# Patient Record
Sex: MALE | Race: BLACK OR AFRICAN AMERICAN | Employment: FULL TIME | URBAN - METROPOLITAN AREA
[De-identification: names, ages, dates, MRNs, and addresses within clinical notes are randomized per-mention and may not be internally consistent; named-entity substitution may affect disease eponyms.]

---

## 2017-07-26 ENCOUNTER — OFFICE VISIT (OUTPATIENT)
Dept: ORTHOPEDIC SURGERY | Age: 47
End: 2017-07-26

## 2017-07-26 VITALS
DIASTOLIC BLOOD PRESSURE: 92 MMHG | RESPIRATION RATE: 16 BRPM | WEIGHT: 230.2 LBS | HEIGHT: 69 IN | SYSTOLIC BLOOD PRESSURE: 141 MMHG | BODY MASS INDEX: 34.1 KG/M2 | HEART RATE: 89 BPM

## 2017-07-26 DIAGNOSIS — R29.898 LEFT LEG WEAKNESS: ICD-10-CM

## 2017-07-26 DIAGNOSIS — M54.42 ACUTE LEFT-SIDED LOW BACK PAIN WITH LEFT-SIDED SCIATICA: Primary | ICD-10-CM

## 2017-07-26 PROBLEM — M54.40 ACUTE LEFT-SIDED LOW BACK PAIN WITH SCIATICA: Status: ACTIVE | Noted: 2017-07-26

## 2017-07-26 RX ORDER — PREDNISONE 10 MG/1
TABLET ORAL
Refills: 0 | COMMUNITY
Start: 2017-07-10 | End: 2017-10-11 | Stop reason: ALTCHOICE

## 2017-07-26 RX ORDER — DIAZEPAM 2 MG/1
TABLET ORAL
Refills: 0 | COMMUNITY
Start: 2017-07-10 | End: 2021-05-13

## 2017-07-26 RX ORDER — TOPIRAMATE 25 MG/1
TABLET ORAL
Qty: 90 TAB | Refills: 0 | Status: SHIPPED | OUTPATIENT
Start: 2017-07-26 | End: 2017-10-11 | Stop reason: SDUPTHER

## 2017-07-26 RX ORDER — HYDROCODONE BITARTRATE AND ACETAMINOPHEN 5; 325 MG/1; MG/1
1 TABLET ORAL
Qty: 28 TAB | Refills: 0 | Status: SHIPPED | OUTPATIENT
Start: 2017-07-26 | End: 2021-05-13

## 2017-07-26 RX ORDER — TRAMADOL HYDROCHLORIDE 50 MG/1
TABLET ORAL
Refills: 0 | COMMUNITY
Start: 2017-07-10 | End: 2021-05-13

## 2017-07-26 NOTE — PROGRESS NOTES
Chief complaint/History of Present Illness:  Chief Complaint   Patient presents with    Back Pain     lower    Hip Pain     left    Leg Pain     left    Follow-up     HPI  Trey Broussard is a  56 y.o.  male      HISTORY OF PRESENT ILLNESS:  The patient comes in today after having last been seen by Dr. Brittanie Pedro September 23, 2016. At that time, he was doing well on Topamax 75 mg twice a day for his low back pain and left leg pain due to a large L5-S1 disc protrusion that impinged the left S1 nerve. 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 He states on July 10, 2017, he was at work and lifted a box and felt immediate back pain. He states he then started having left leg pain that became unbearable in his left buttock down the posterior leg to his foot. He has numbness and weakness. He states he cannot feel his leg at all and denies saddle anesthesia. He went to the [facility] ER and was seen by the N.P. there who gave him Prednisone, Valium, and Tramadol. He did complete the Prednisone. The Valium and Tramadol really did not help at all. He did not get any relief. His pain just became progressively worse, and he went to the [facility] on July 20, 2017, at which time they repeated the Prednisone. He only has about one or two days left of that, and it has not helped at all. He feels like he is dragging his foot at times and walks with a limp due to the weakness in his leg. He denies fever or bowel or bladder dysfunction. He smokes one-third pack of cigarettes per day. He has been off the Topamax since last December, and was doing well up until this point. PHYSICAL EXAM:  Mr. Sophie Skaggs is a 56-year-old male. He is alert and oriented. He has a very antalgic gait favoring that left leg. He walks flat-footed and does not really pick that left leg up.   He has 3/5 strength of his left quadriceps and tib/ant and dorsiflexors, 1/5 strength of his left EHL and 5/5 on the right. He has a positive straight leg raise on the left and a positive contralateral straight leg raise on the right. ASSESSENT/PLAN:  This is a patient who has a known, large, disc protrusion on the left at L5-S1. I think he may have herniated his disc, which is giving him neurological dysfunction. We are going to get a STAT MRI. I am going to restart him back on Topamax, ramp him up to 75 mg at nighttime. He rates his pain as a 9/10. He denies any alcohol or substance abuse issues personally or in his family. We are going to give him Norco 5/325 mg to take every six hours as-needed for pain. I emphasized to him he is only to take it as needed. If he does not need it every six hours, he should not take it but only when his pain is very extreme or he cannot handle it. He verbalized understanding. We are going to have him followup with Dr. Ravinder Monson following the MRI. Review of systems:    History reviewed. No pertinent past medical history. History reviewed. No pertinent surgical history. Social History     Social History    Marital status:      Spouse name: N/A    Number of children: N/A    Years of education: N/A     Occupational History    Not on file. Social History Main Topics    Smoking status: Current Every Day Smoker     Packs/day: 0.25    Smokeless tobacco: Never Used    Alcohol use 7.2 oz/week     12 Cans of beer per week    Drug use: No    Sexual activity: Not on file     Other Topics Concern    Not on file     Social History Narrative     Family History   Problem Relation Age of Onset    No Known Problems Mother     No Known Problems Father        Physical Exam:  Visit Vitals    BP (!) 141/92    Pulse 89    Resp 16    Ht 5' 9\" (1.753 m)    Wt 230 lb 3.2 oz (104.4 kg)    BMI 33.99 kg/m2     Pain Scale: 8/10     has been . reviewed and is appropriate    Pt has a consistent UDS in the record      Diagnoses and all orders for this visit:    1. Acute left-sided low back pain with left-sided sciatica  -     MRI LUMB SPINE WO CONT; Future    2. Left leg weakness  -     MRI LUMB SPINE WO CONT; Future    Other orders  -     topiramate (TOPAMAX) 25 mg tablet; 1 tab nightly  x 5 days, then 2 tabs nightly x 5 day and then 3 tabs nightly  -     HYDROcodone-acetaminophen (NORCO) 5-325 mg per tablet; Take 1 Tab by mouth every six (6) hours as needed for Pain. Max Daily Amount: 4 Tabs. Indications: Pain            Follow-up Disposition:  Return for f/u as soon as MRI is done with Dr Ben Garcia.         We have informed Chiquita Mckinley to notify us for immediate appointment if he has any worsening neurogical symptoms or if an emergency situation presents, then call 1

## 2017-07-26 NOTE — LETTER
NOTIFICATION RETURN TO WORK / SCHOOL 
 
7/26/2017 11:33 AM 
 
Mr. Nohelia Mueller 
[address] [address] [place] [address] To Whom It May Concern: 
 
Nohelia Mueller is currently under the care of [address]. Mr. Cortney Skaggs is to remain out of work for 1 week, until he is seen in our office. At which time, his work status will be re-evaluated. If there are questions or concerns please have the patient contact our office. Sincerely, Janis Mckinney NP

## 2017-07-28 ENCOUNTER — DOCUMENTATION ONLY (OUTPATIENT)
Dept: ORTHOPEDIC SURGERY | Age: 47
End: 2017-07-28

## 2017-07-28 NOTE — PROGRESS NOTES
MRI Lumbar Spine with is scheduled at [facility], [id number], on 07/28/17, arrive 3:00PM, test 3:30PM. Pre-authorization [id number], effective 07/26/17-10/26/17

## 2017-08-01 ENCOUNTER — OFFICE VISIT (OUTPATIENT)
Dept: ORTHOPEDIC SURGERY | Age: 47
End: 2017-08-01

## 2017-08-01 VITALS
WEIGHT: 230 LBS | BODY MASS INDEX: 34.07 KG/M2 | DIASTOLIC BLOOD PRESSURE: 93 MMHG | HEART RATE: 94 BPM | SYSTOLIC BLOOD PRESSURE: 139 MMHG | HEIGHT: 69 IN

## 2017-08-01 DIAGNOSIS — M51.26 LUMBAR HERNIATED DISC: Primary | ICD-10-CM

## 2017-08-01 DIAGNOSIS — M54.16 LUMBAR NEURITIS: ICD-10-CM

## 2017-08-01 DIAGNOSIS — M48.061 LUMBAR SPINAL STENOSIS: ICD-10-CM

## 2017-08-01 RX ORDER — TOPIRAMATE 25 MG/1
TABLET ORAL
Qty: 90 TAB | Refills: 1 | Status: SHIPPED | OUTPATIENT
Start: 2017-08-01 | End: 2017-10-11 | Stop reason: SDUPTHER

## 2017-08-01 NOTE — MR AVS SNAPSHOT
Visit Information Date & Time Provider Department Dept. Phone Encounter #  
 8/1/2017  8:30 AM Davion Hobbs MD [facility] [place] [phone] [id number] Follow-up Instructions Return in about 4 weeks (around 8/29/2017). Upcoming Health Maintenance Date Due Pneumococcal 19-64 Medium Risk (1 of 1 - PPSV23) 1/16/1990 DTaP/Tdap/Td series (1 - Tdap) 1/16/1992 INFLUENZA AGE 9 TO ADULT 8/1/2017 Allergies as of 8/1/2017  Review Complete On: 8/1/2017 By: Davion Hobbs MD  
 No Known Allergies Current Immunizations  Never Reviewed No immunizations on file. Not reviewed this visit You Were Diagnosed With   
  
 Codes Comments Lumbar herniated disc    -  Primary ICD-10-CM: M51.26 
ICD-9-CM: 722.10 Lumbar spinal stenosis     ICD-10-CM: M48.06 
ICD-9-CM: 724.02 Lumbar neuritis     ICD-10-CM: M54.16 
ICD-9-CM: 724.4 Vitals BP Pulse Height(growth percentile) Weight(growth percentile) BMI Smoking Status (!) 139/93 94 5' 9\" (1.753 m) 230 lb (104.3 kg) 33.97 kg/m2 Current Every Day Smoker Vitals History BMI and BSA Data Body Mass Index Body Surface Area  
 33.97 kg/m 2 2.25 m 2 Preferred Pharmacy Pharmacy Name Phone [facility], [address] [phone] Your Updated Medication List  
  
   
This list is accurate as of: 8/1/17  9:07 AM.  Always use your most recent med list.  
  
  
  
  
 diazePAM 2 mg tablet Commonly known as:  VALIUM TK 1 T PO TID PRN HYDROcodone-acetaminophen 5-325 mg per tablet Commonly known as:  Aga [place]quito Take 1 Tab by mouth every six (6) hours as needed for Pain. Max Daily Amount: 4 Tabs. Indications: Pain  
  
 naproxen sodium 220 mg Cap Take  by mouth.  
  
 predniSONE 10 mg tablet Commonly known as:  DELTASONE  
  
 * topiramate 25 mg tablet Commonly known as:  TOPAMAX 3 tabs PO BID * topiramate 25 mg tablet Commonly known as:  TOPAMAX  
1 tab nightly  x 5 days, then 2 tabs nightly x 5 day and then 3 tabs nightly * topiramate 25 mg tablet Commonly known as:  TOPAMAX 3 tabs PO QHS  
  
 traMADol 50 mg tablet Commonly known as:  ULTRAM  
TK 1 T PO Q 6 H PRN  
  
 * Notice: This list has 3 medication(s) that are the same as other medications prescribed for you. Read the directions carefully, and ask your doctor or other care provider to review them with you. Prescriptions Sent to Pharmacy Refills  
 topiramate (TOPAMAX) 25 mg tablet 1 Sig: 3 tabs PO QHS Class: Normal  
 Pharmacy: [address] [place] [address], [address] [place] [address] #: [phone] Follow-up Instructions Return in about 4 weeks (around 8/29/2017). Introducing [facility]! [facility] introduces TasteBook patient portal. Now you can access parts of your medical record, email your doctor's office, and request medication refills online. 1. In your internet browser, go to [email or web address] 2. Click on the First Time User? Click Here link in the Sign In box. You will see the New Member Sign Up page. 3. Enter your TasteBook Access Code exactly as it appears below. You will not need to use this code after youve completed the sign-up process. If you do not sign up before the expiration date, you must request a new code. · TasteBook Access Code: [email or web address] Expires: 10/23/2017 11:33 AM 
 
4. Enter the last four digits of your Social Security Number (xxxx) and Date of Birth (mm/dd/yyyy) as indicated and click Submit. You will be taken to the next sign-up page. 5. Create a TasteBook ID. This will be your TasteBook login ID and cannot be changed, so think of one that is secure and easy to remember. 6. Create a Tipping Bucket password. You can change your password at any time. 7. Enter your Password Reset Question and Answer. This can be used at a later time if you forget your password. 8. Enter your e-mail address. You will receive e-mail notification when new information is available in [address] 9. Click Sign Up. You can now view and download portions of your medical record. 10. Click the Download Summary menu link to download a portable copy of your medical information. If you have questions, please visit the Frequently Asked Questions section of the Tipping Bucket website. Remember, Tipping Bucket is NOT to be used for urgent needs. For medical emergencies, dial 911. Now available from your iPhone and Android! Please provide this summary of care documentation to your next provider. If you have any questions after today's visit, please call [phone].

## 2017-08-01 NOTE — PROGRESS NOTES
[facility]  [address], [place]   Phone: [phone]  Fax: [phone]        PROGRESS NOTE      HISTORY OF PRESENT ILLNESS:  The patient is a 56 y.o. male and was seen today for follow up of low back pain into left hip with LLE tingling in an L5 distribution to the calf. He denies RLE symptoms. He reports LLE symptoms are more severe than low back pain. Pt states symptoms have improved since onset. He states all positions for prolonged periods exacerbate his pain. He is on full duty at work. Note from [place], [place] dated 5/15/16 patient was seen with c/o low back into BLE x 2 weeks. Of note, onset of pain after lifting 50 lbs at work. At that time, he was treated with Prednisone and Flexeril with significant relief. He has also taken Aleve with slight relief. Pt denies change in bowel or bladder habits. Pt denies fever, weight loss, or skin changes. He denies previous lumbar spinal surgery or blocks. He has not attended physical therapy/chiropractor. He denies hx of glaucoma or DM. Noted, patient is right-hand dominant and a smoker. Lumbosacral spine x-ray report dated 5/15/16 reviewed. Per report, mild L5-S1 disc space narrowing and facet hypertrophy. MRI of lumbar spine dated 9/9/16 reviewed. Per report, prominent left paracentral disc extrusion at L5-S1 with caudal migration of disc material measuring up to 12 x 9 x 14 mm with associated narrowing of the left lateral recess and evidence of impingement upon the left S1 nerve root. At his last clinical appointment, he was not particularly interested in surgical intervention or blocks. Patient wished to continue his current treatment. Patient noted significant improvement in symptoms with increased Topamax 75 mg BID. He was provided with refills of Topamax. I offered a physical therapy referral. He declined. The patient returns today with LLE numbness extending in an S1 distribution to the ankle.  He rates pain 6/10, an improvement since his last visit (9/10). I last saw patient on 9/23/16 who was to f/u in 3 month's time but failed to do so. Pt more recently saw my nurse practitioner on 7/26/17. 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 Pt was given a course of oral steroids on 7/10/17 after he reported to the ER after his work-related injury. Patience Man NP restarted patient on Topamax 25 mg 3 tabs ramped and gave him a Rx for Norco 5-325 mg on 7/26/17. Lumbar spine MRI dated 7/28/17 reviewed. Per report, only very minimal interval decrease ins seize of the still moderate left paracentral extruded L5-S1 disc herniation since prior exam 9/2016. Persistent stable flattening of the left ventral lateral thecal sac and impingement on the left traversing S1 root. Minimal developmental lumbar canal stenosis. Mild L4-5 central stenosis from additional mild disc bulge. Mild bilateral L4-5, minimal bilateral L5-S1 foraminal stenosis. Minimal bilateral L4-5, L5-S1 facet joint osteoarthritis.  reviewed. History reviewed. No pertinent past medical history. Social History     Social History    Marital status:      Spouse name: N/A    Number of children: N/A    Years of education: N/A     Occupational History    Not on file.      Social History Main Topics    Smoking status: Current Every Day Smoker     Packs/day: 0.25    Smokeless tobacco: Never Used    Alcohol use 7.2 oz/week     12 Cans of beer per week    Drug use: No    Sexual activity: Not on file     Other Topics Concern    Not on file     Social History Narrative       Current Outpatient Prescriptions   Medication Sig Dispense Refill    topiramate (TOPAMAX) 25 mg tablet 3 tabs PO QHS 90 Tab 1    topiramate (TOPAMAX) 25 mg tablet 1 tab nightly  x 5 days, then 2 tabs nightly x 5 day and then 3 tabs nightly 90 Tab 0    HYDROcodone-acetaminophen (NORCO) 5-325 mg per tablet Take 1 Tab by mouth every six (6) hours as needed for Pain. Max Daily Amount: 4 Tabs. Indications: Pain 28 Tab 0    topiramate (TOPAMAX) 25 mg tablet 3 tabs PO  Tab 2    diazePAM (VALIUM) 2 mg tablet TK 1 T PO TID PRN  0    predniSONE (DELTASONE) 10 mg tablet   0    traMADol (ULTRAM) 50 mg tablet TK 1 T PO Q 6 H PRN  0    naproxen sodium 220 mg cap Take  by mouth. No Known Allergies       PHYSICAL EXAMINATION    Visit Vitals    BP (!) 139/93    Pulse 94    Ht 5' 9\" (1.753 m)    Wt 230 lb (104.3 kg)    BMI 33.97 kg/m2       CONSTITUTIONAL: NAD, A&O x 3  SENSATION: Intact to light touch throughout  RANGE OF MOTION: The patient has full passive range of motion in all four extremities. MOTOR:  Straight Leg Raise: Positive, left    Previously reported weakness in extensor hallucis longus not noted on examination today. Hip Flex Knee Ext Knee Flex Ankle DF GTE Ankle PF Tone   Right +4/5 +4/5 +4/5 +4/5 +4/5 +4/5 +4/5   Left +4/5 +4/5 +4/5 +4/5 +4/5 +4/5 +4/5       ASSESSMENT   Diagnoses and all orders for this visit:    1. Lumbar herniated disc    2. Lumbar spinal stenosis    3. Lumbar neuritis    Other orders  -     topiramate (TOPAMAX) 25 mg tablet; 3 tabs PO QHS          IMPRESSION AND PLAN:  The patient describes symptoms consistent with S1 radiculopathy. He is neurologically intact. Patient is not interested in lumbar blocks at this time. He should continue on Topamax 75 mg qhs ramped and was provided with refills. OOW until 8/7/17. I will see the patient back in 1 month's time or earlier if needed. Written by Penelope Garcia, as dictated by Candice Langford MD  I examined the patient, reviewed and agree with the note.

## 2017-08-01 NOTE — LETTER
NOTIFICATION OF RETURN TO WORK / SCHOOL 
 
8/1/2017 9:06 AM 
 
Mr. Jyoti Knapp 
[address] [address] [address] To Whom It May Concern: 
 
Jyoti Knapp was under the care of [address] today 8-1-17. Mr. Grupo Tabares is to remain out of work until 8-7-17. If you have any questions please call the office at [phone]. Sincerely, Joe Martinez MD

## 2017-08-21 DIAGNOSIS — M54.42 ACUTE LEFT-SIDED LOW BACK PAIN WITH LEFT-SIDED SCIATICA: ICD-10-CM

## 2017-08-21 DIAGNOSIS — R29.898 LEFT LEG WEAKNESS: ICD-10-CM

## 2017-08-30 ENCOUNTER — OFFICE VISIT (OUTPATIENT)
Dept: ORTHOPEDIC SURGERY | Age: 47
End: 2017-08-30

## 2017-08-30 VITALS
DIASTOLIC BLOOD PRESSURE: 82 MMHG | BODY MASS INDEX: 33.33 KG/M2 | HEART RATE: 63 BPM | RESPIRATION RATE: 16 BRPM | HEIGHT: 69 IN | WEIGHT: 225 LBS | SYSTOLIC BLOOD PRESSURE: 117 MMHG

## 2017-08-30 DIAGNOSIS — M51.26 LUMBAR HERNIATED DISC: Primary | ICD-10-CM

## 2017-08-30 DIAGNOSIS — M54.16 RADICULOPATHY, LUMBAR REGION: ICD-10-CM

## 2017-08-30 DIAGNOSIS — M48.061 LUMBAR SPINAL STENOSIS: ICD-10-CM

## 2017-08-30 RX ORDER — TOPIRAMATE 25 MG/1
TABLET ORAL
Qty: 180 TAB | Refills: 1 | Status: SHIPPED | OUTPATIENT
Start: 2017-08-30 | End: 2017-10-11 | Stop reason: SDUPTHER

## 2017-08-30 NOTE — MR AVS SNAPSHOT
Visit Information Date & Time Provider Department Dept. Phone Encounter #  
 8/30/2017  8:30 AM Bry Parada NP [facility] [place] [id number] [phone] Follow-up Instructions Return in about 6 weeks (around 10/11/2017) for with Kasey. Follow-up and Disposition History Your Appointments 10/11/2017  9:10 AM  
Follow Up with Bry Parada NP  
[facility] Appt Note: 6 week follow up [address] [facility] [id number]  
[address] [facility] Upcoming Health Maintenance Date Due Pneumococcal 19-64 Medium Risk (1 of 1 - PPSV23) 1/16/1990 DTaP/Tdap/Td series (1 - Tdap) 1/16/1992 INFLUENZA AGE 9 TO ADULT 8/1/2017 Allergies as of 8/30/2017  Review Complete On: 8/30/2017 By: Bry Parada NP No Known Allergies Current Immunizations  Never Reviewed No immunizations on file. Not reviewed this visit You Were Diagnosed With   
  
 Codes Comments Lumbar herniated disc    -  Primary ICD-10-CM: M51.26 
ICD-9-CM: 722.10 Lumbar spinal stenosis     ICD-10-CM: M48.06 
ICD-9-CM: 724.02 Radiculopathy, lumbar region     ICD-10-CM: M54.16 
ICD-9-CM: 724.4 Vitals BP Pulse Resp Height(growth percentile) Weight(growth percentile) BMI  
 117/82 63 16 5' 9\" (1.753 m) 225 lb (102.1 kg) 33.23 kg/m2 Smoking Status Current Every Day Smoker BMI and BSA Data Body Mass Index Body Surface Area  
 33.23 kg/m 2 2.23 m 2 Preferred Pharmacy Pharmacy Name Phone [facility], [address] [facility] [phone] Your Updated Medication List  
  
   
This list is accurate as of: 8/30/17  9:09 AM.  Always use your most recent med list.  
  
  
  
  
 diazePAM 2 mg tablet Commonly known as:  VALIUM  
 TK 1 T PO TID PRN HYDROcodone-acetaminophen 5-325 mg per tablet Commonly known as:  [place] Collar Take 1 Tab by mouth every six (6) hours as needed for Pain. Max Daily Amount: 4 Tabs. Indications: Pain  
  
 naproxen sodium 220 mg Cap Take  by mouth.  
  
 predniSONE 10 mg tablet Commonly known as:  DELTASONE  
  
 * topiramate 25 mg tablet Commonly known as:  TOPAMAX 3 tabs PO BID * topiramate 25 mg tablet Commonly known as:  TOPAMAX  
1 tab nightly  x 5 days, then 2 tabs nightly x 5 day and then 3 tabs nightly * topiramate 25 mg tablet Commonly known as:  TOPAMAX 3 tabs PO QHS * topiramate 25 mg tablet Commonly known as:  TOPAMAX 3 tabs bid  
  
 traMADol 50 mg tablet Commonly known as:  ULTRAM  
TK 1 T PO Q 6 H PRN  
  
 * Notice: This list has 4 medication(s) that are the same as other medications prescribed for you. Read the directions carefully, and ask your doctor or other care provider to review them with you. Prescriptions Printed Refills  
 topiramate (TOPAMAX) 25 mg tablet 1 Sig: 3 tabs bid Class: Print Follow-up Instructions Return in about 6 weeks (around 10/11/2017) for with Kasey. Introducing [facility]! Diana Bruce introduces Litbloc patient portal. Now you can access parts of your medical record, email your doctor's office, and request medication refills online. 1. In your internet browser, go to [email or web address] 2. Click on the First Time User? Click Here link in the Sign In box. You will see the New Member Sign Up page. 3. Enter your Litbloc Access Code exactly as it appears below. You will not need to use this code after youve completed the sign-up process. If you do not sign up before the expiration date, you must request a new code. · Litbloc Access Code: [email or web address] Expires: 10/23/2017 11:33 AM 
 
4.  Enter the last four digits of your Social Security Number (xxxx) and Date of Birth (mm/dd/yyyy) as indicated and click Submit. You will be taken to the next sign-up page. 5. Create a Paytrail ID. This will be your Paytrail login ID and cannot be changed, so think of one that is secure and easy to remember. 6. Create a Paytrail password. You can change your password at any time. 7. Enter your Password Reset Question and Answer. This can be used at a later time if you forget your password. 8. Enter your e-mail address. You will receive e-mail notification when new information is available in [address] 9. Click Sign Up. You can now view and download portions of your medical record. 10. Click the Download Summary menu link to download a portable copy of your medical information. If you have questions, please visit the Frequently Asked Questions section of the Paytrail website. Remember, Paytrail is NOT to be used for urgent needs. For medical emergencies, dial 911. Now available from your iPhone and Android! Please provide this summary of care documentation to your next provider. If you have any questions after today's visit, please call [phone].

## 2017-08-30 NOTE — LETTER
NOTIFICATION OF RETURN TO WORK / SCHOOL 
 
8/30/2017 9:05 AM 
 
Mr. Tamy Bray 
[address] [facility] [address] [address] [place] To Whom It May Concern: 
 
Tamy Bray is under the care of [address]. 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 If there are questions or concerns please have the patient contact our office. Sincerely, Libra Augustin NP

## 2017-08-30 NOTE — PROGRESS NOTES
Chief complaint/History of Present Illness:  Chief Complaint   Patient presents with    Back Pain     Follow up     Hip Pain     left hip    Leg Pain     ANDRADE Grady is a  56 y.o.  male      HISTORY OF PRESENT ILLNESS:  The patient comes in today for followup of his low back pain with radiating left buttock and leg pain down to his calf. He has numbness in his left leg and foot now. His leg pain is worse than his back pain. He is rating it as a 5/10. He does have a known left paracentral disc herniation. He was last seen by Dr. Jerica Szymanski. He is on Topamax 75 mg at bedtime. He states that does help some. Sitting increases his leg pain. 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 Once he gets off the forklift, it will eventually get a little bit better, but he finds that it aggravates it when he has to be in the forklift. When he does other activities, such as with the containers or paperwork and such, he does not really have that issue. He smokes one-third pack of cigarettes per day. He denies fever or bowel or bladder dysfunction. PHYSICAL EXAM:  Mr. Jaclyn Guido is a 56-year-old male. He is alert and oriented. He has a full weight bearing, non-antalgic gait with 4/5 strength of the bilateral lower extremities. He has a positive straight leg raise on the left and a positive contralateral straight leg raise on the right. ASSESSENT/PLAN:  This is a patient with a left herniated disc, which causes stenosis and flattens the left, ventral lateral thecal sac impinging the left tranversing S1 nerve root. We talked about blocks. He is still hesitant to do that. He still wishes to hold off on any surgery. We are going to increase his Topamax to 75 mg ramped up to twice a day. He has been on this dose before in the past and it did seem to help.   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 We will see him back in six weeks. Review of systems:    History reviewed. No pertinent past medical history. History reviewed. No pertinent surgical history. Social History     Social History    Marital status:      Spouse name: N/A    Number of children: N/A    Years of education: N/A     Occupational History    Not on file. Social History Main Topics    Smoking status: Current Every Day Smoker     Packs/day: 0.25    Smokeless tobacco: Never Used    Alcohol use 7.2 oz/week     12 Cans of beer per week    Drug use: No    Sexual activity: Not on file     Other Topics Concern    Not on file     Social History Narrative     Family History   Problem Relation Age of Onset    No Known Problems Mother     No Known Problems Father        Physical Exam:  Visit Vitals    /82    Pulse 63    Resp 16    Ht 5' 9\" (1.753 m)    Wt 225 lb (102.1 kg)    BMI 33.23 kg/m2     Pain Scale: 5/10     has been . reviewed and is appropriat    Diagnoses and all orders for this visit:    1. Lumbar herniated disc    2. Lumbar spinal stenosis    3. Radiculopathy, lumbar region    Other orders  -     topiramate (TOPAMAX) 25 mg tablet; 3 tabs bid            Follow-up Disposition:  Return in about 6 weeks (around 10/11/2017) for with Kasey.         We have informed Analy Meka to notify us for immediate appointment if he has any worsening neurogical symptoms or if an emergency situation presents, then call [phone]

## 2017-10-11 ENCOUNTER — OFFICE VISIT (OUTPATIENT)
Dept: ORTHOPEDIC SURGERY | Age: 47
End: 2017-10-11

## 2017-10-11 VITALS
BODY MASS INDEX: 33.03 KG/M2 | WEIGHT: 223 LBS | HEIGHT: 69 IN | HEART RATE: 61 BPM | SYSTOLIC BLOOD PRESSURE: 104 MMHG | DIASTOLIC BLOOD PRESSURE: 77 MMHG

## 2017-10-11 DIAGNOSIS — M48.062 SPINAL STENOSIS OF LUMBAR REGION WITH NEUROGENIC CLAUDICATION: ICD-10-CM

## 2017-10-11 DIAGNOSIS — M54.16 RADICULOPATHY, LUMBAR REGION: ICD-10-CM

## 2017-10-11 DIAGNOSIS — M51.26 LUMBAR HERNIATED DISC: Primary | ICD-10-CM

## 2017-10-11 RX ORDER — TOPIRAMATE 25 MG/1
TABLET ORAL
Qty: 180 TAB | Refills: 5 | Status: SHIPPED | OUTPATIENT
Start: 2017-10-11 | End: 2018-06-13 | Stop reason: SDUPTHER

## 2017-10-11 NOTE — PATIENT INSTRUCTIONS
Back Pain: Care Instructions  Your Care Instructions    Back pain has many possible causes. It is often related to problems with muscles and ligaments of the back. It may also be related to problems with the nerves, discs, or bones of the back. Moving, lifting, standing, sitting, or sleeping in an awkward way can strain the back. Sometimes you don't notice the injury until later. Arthritis is another common cause of back pain. Although it may hurt a lot, back pain usually improves on its own within several weeks. Most people recover in 12 weeks or less. Using good home treatment and being careful not to stress your back can help you feel better sooner. Follow-up care is a key part of your treatment and safety. Be sure to make and go to all appointments, and call your doctor if you are having problems. Its also a good idea to know your test results and keep a list of the medicines you take. How can you care for yourself at home? · Sit or lie in positions that are most comfortable and reduce your pain. Try one of these positions when you lie down:  ¨ Lie on your back with your knees bent and supported by large pillows. ¨ Lie on the floor with your legs on the seat of a sofa or chair. Jeffry Maw on your side with your knees and hips bent and a pillow between your legs. ¨ Lie on your stomach if it does not make pain worse. · Do not sit up in bed, and avoid soft couches and twisted positions. Bed rest can help relieve pain at first, but it delays healing. Avoid bed rest after the first day of back pain. · Change positions every 30 minutes. If you must sit for long periods of time, take breaks from sitting. Get up and walk around, or lie in a comfortable position. · Try using a heating pad on a low or medium setting for 15 to 20 minutes every 2 or 3 hours. Try a warm shower in place of one session with the heating pad. · You can also try an ice pack for 10 to 15 minutes every 2 to 3 hours.  Put a thin cloth between the ice pack and your skin. · Take pain medicines exactly as directed. ¨ If the doctor gave you a prescription medicine for pain, take it as prescribed. ¨ If you are not taking a prescription pain medicine, ask your doctor if you can take an over-the-counter medicine. · Take short walks several times a day. You can start with 5 to 10 minutes, 3 or 4 times a day, and work up to longer walks. Walk on level surfaces and avoid hills and stairs until your back is better. · Return to work and other activities as soon as you can. Continued rest without activity is usually not good for your back. · To prevent future back pain, do exercises to stretch and strengthen your back and stomach. Learn how to use good posture, safe lifting techniques, and proper body mechanics. When should you call for help? Call your doctor now or seek immediate medical care if:  · You have new or worsening numbness in your legs. · You have new or worsening weakness in your legs. (This could make it hard to stand up.)  · You lose control of your bladder or bowels. Watch closely for changes in your health, and be sure to contact your doctor if:  · Your pain gets worse. · You are not getting better after 2 weeks. Where can you learn more? Go to http://theresa-uzma.info/. Enter T981 in the search box to learn more about \"Back Pain: Care Instructions. \"  Current as of: March 21, 2017  Content Version: 11.3  © 5183-9646 [employer]. Care instructions adapted under license by [employer] (which disclaims liability or warranty for this information). If you have questions about a medical condition or this instruction, always ask your healthcare professional. [address] any warranty or liability for your use of this information.

## 2017-10-11 NOTE — PROGRESS NOTES
Chief complaint/History of Present Illness:  Chief Complaint   Patient presents with    Follow-up     lumbar LLE pain terminating at calf. numbness/tingling in left foot     HPI  Harshal Harvey is a  56 y.o.  male      HISTORY OF PRESENT ILLNESS:  The patient comes in today for followup of his chronic low back pain that radiates to his left buttock down to his calf with numbness down to his foot. His leg pain is worse than his back pain. At his last visit, we increased his Topamax to 75 mg twice a day. He states it does help. 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 It is a confined space. He is always having to turn and twist.  He has been working, lifting boxes, and cleaning up at work. His work would like him to be able to work the forklift up to two hours a day, and he feels like he may be able to do that. He states when he was able to work the forklift, his pain was an 8-9/10. Now that he is not doing that, it is a 4/10, so he feels that is quite a significant improvement. He still smokes one-third pack of cigarettes per day. He denies fever or bowel or bladder dysfunction. PHYSICAL EXAM:  Mr. Monica Vizcaino is a 60-year-old male. He is alert and oriented. He has normal mood and affect. He has a full weight bearing, non-antalgic gait. He has 4/5 strength of the bilateral lower extremities and negative straight leg raise. ASSESSENT/PLAN:  This is a patient with a left-sided herniated disc with stenosis that impinges on the S1 nerve root. We are going to keep him at 75 mg of Topamax twice a day. I have sent refills in for him. 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 Review of systems:    History reviewed. No pertinent past medical history. History reviewed.  No pertinent surgical history. Social History     Social History    Marital status:      Spouse name: N/A    Number of children: N/A    Years of education: N/A     Occupational History    Not on file. Social History Main Topics    Smoking status: Current Every Day Smoker     Packs/day: 0.25    Smokeless tobacco: Never Used    Alcohol use 7.2 oz/week     12 Cans of beer per week    Drug use: No    Sexual activity: Not on file     Other Topics Concern    Not on file     Social History Narrative     Family History   Problem Relation Age of Onset    No Known Problems Mother     No Known Problems Father        Physical Exam:  Visit Vitals    /77    Pulse 61    Ht 5' 9\" (1.753 m)    Wt 223 lb (101.2 kg)    BMI 32.93 kg/m2     Pain Scale: 4/10     has been . reviewed and is appropriate        Diagnoses and all orders for this visit:    1. Lumbar herniated disc    2. Spinal stenosis of lumbar region with neurogenic claudication    3. Radiculopathy, lumbar region    Other orders  -     topiramate (TOPAMAX) 25 mg tablet; 3 tabs bid            Follow-up Disposition:  Return in about 6 months (around 4/11/2018) for with Kasey.         We have informed Harshal Harvey to notify us for immediate appointment if he has any worsening neurogical symptoms or if an emergency situation presents, then call 911

## 2017-10-11 NOTE — LETTER
NOTIFICATION OF RETURN TO WORK / SCHOOL 
 
10/11/2017 9:27 AM 
 
Mr. Tu Yates 
[address] [facility] [address] [id number] [facility] To Whom It May Concern: 
 
Tu Yates was under the care of [address] [place] [address] [facility] today 10-11-17. Mr. 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 If you have any questions please call the office at [phone]. Sincerely, Radha Soliz NP

## 2018-02-12 ENCOUNTER — OFFICE VISIT (OUTPATIENT)
Dept: ORTHOPEDIC SURGERY | Age: 48
End: 2018-02-12

## 2018-02-12 VITALS
SYSTOLIC BLOOD PRESSURE: 126 MMHG | OXYGEN SATURATION: 99 % | WEIGHT: 217 LBS | HEIGHT: 69 IN | HEART RATE: 67 BPM | DIASTOLIC BLOOD PRESSURE: 87 MMHG | BODY MASS INDEX: 32.14 KG/M2

## 2018-02-12 DIAGNOSIS — M51.36 OTHER INTERVERTEBRAL DISC DEGENERATION, LUMBAR REGION: Primary | ICD-10-CM

## 2018-02-12 DIAGNOSIS — M54.16 RADICULOPATHY, LUMBAR REGION: ICD-10-CM

## 2018-02-12 DIAGNOSIS — M51.26 HNP (HERNIATED NUCLEUS PULPOSUS), LUMBAR: ICD-10-CM

## 2018-02-12 DIAGNOSIS — M48.062 SPINAL STENOSIS OF LUMBAR REGION WITH NEUROGENIC CLAUDICATION: ICD-10-CM

## 2018-02-12 RX ORDER — NAPROXEN 500 MG/1
500 TABLET ORAL 2 TIMES DAILY WITH MEALS
Qty: 60 TAB | Refills: 0 | Status: SHIPPED | OUTPATIENT
Start: 2018-02-12 | End: 2018-06-13 | Stop reason: SDUPTHER

## 2018-02-12 RX ORDER — METHYLPREDNISOLONE 4 MG/1
TABLET ORAL
Qty: 1 DOSE PACK | Refills: 0 | Status: SHIPPED | OUTPATIENT
Start: 2018-02-12 | End: 2018-03-12 | Stop reason: ALTCHOICE

## 2018-02-12 RX ORDER — CYCLOBENZAPRINE HCL 10 MG
TABLET ORAL
Qty: 30 TAB | Refills: 0 | Status: SHIPPED | OUTPATIENT
Start: 2018-02-12 | End: 2021-05-13

## 2018-02-12 NOTE — LETTER
NOTIFICATION OF RETURN TO WORK / SCHOOL 
 
2/12/2018 12:39 PM 
 
Mr. Eleuterio Boyer [address] [facility] [place] [address] [facility] [place] To Whom It May Concern: 
 
Eleuterio Boyer was under the care of [address] was seen today 2-12-18. Mr. Hernan Morales is to return to as of 2-14-18. If you have any questions please call the office at [phone]. Sincerely, Deborah Wheeler MD

## 2018-02-12 NOTE — PROGRESS NOTES
[facility]  [address], [place]   Phone: [phone]  Fax: [phone]        PROGRESS NOTE      HISTORY OF PRESENT ILLNESS:  The patient is a 53 y.o. male and was seen today for follow up of low back pain into left hip with LLE tingling in an L5 distribution to the calf. He denies RLE symptoms. He reports LLE symptoms are more severe than low back pain. Pt states symptoms have improved since onset. More recently, patient c/o LLE numbness extending in an S1 distribution to the ankle. He states all positions for prolonged periods exacerbate his pain. He is on full duty at work. Note from [place], [place] dated 5/15/16 patient was seen with c/o low back into BLE x 2 weeks. Of note, onset of pain after lifting 50 lbs at work. At that time, he was treated with Prednisone and Flexeril with significant relief. He has also taken Aleve with slight relief. I last saw patient on 9/23/16 who was to f/u in 3 month's time but failed to do so. Pt more recently saw my nurse practitioner on 7/26/17. 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 Pt was given a course of oral steroids on 7/10/17 after he reported to the ER after his work-related injury. Georginaalejandro Armijos, NP restarted patient on Topamax 25 mg 3 tabs ramped and gave him a Rx for Norco 5-325 mg on 7/26/17. Pt denies change in bowel or bladder habits. Pt denies fever, weight loss, or skin changes. He denies previous lumbar spinal surgery or blocks. He has not attended physical therapy/chiropractor. He denies hx of glaucoma or DM. Noted, patient is right-hand dominant and a smoker. Lumbosacral spine x-ray report dated 5/15/16 reviewed. Per report, mild L5-S1 disc space narrowing and facet hypertrophy. Lumbar spine MRI dated 7/28/17 reviewed.  Per report, only very minimal interval decrease ins seize of the still moderate left paracentral extruded L5-S1 disc herniation since prior exam 9/2016. Persistent stable flattening of the left ventral lateral thecal sac and impingement on the left traversing S1 root. Minimal developmental lumbar canal stenosis. Mild L4-5 central stenosis from additional mild disc bulge. Mild bilateral L4-5, minimal bilateral L5-S1 foraminal stenosis. Minimal bilateral L4-5, L5-S1 facet joint osteoarthritis. At his last clinical appointment, the patient described symptoms consistent with S1 radiculopathy. He was neurologically intact. Patient was not interested in lumbar blocks at this time and was provided with refills. OOW until 8/7/17. The patient returns today with pain location and distribution remain unchanged. He rates pain 6/10, an increase since his last visit (4/10). Pt is compliant with Topamax 75 mg qhs ramped.  reviewed. Body mass index is 32.05 kg/(m^2). No past medical history on file. Social History     Social History    Marital status:      Spouse name: N/A    Number of children: N/A    Years of education: N/A     Occupational History    Not on file. Social History Main Topics    Smoking status: Current Every Day Smoker     Packs/day: 0.25    Smokeless tobacco: Never Used    Alcohol use 7.2 oz/week     12 Cans of beer per week    Drug use: No    Sexual activity: Not on file     Other Topics Concern    Not on file     Social History Narrative       Current Outpatient Prescriptions   Medication Sig Dispense Refill    methylPREDNISolone (MEDROL DOSEPACK) 4 mg tablet Per dose pack instructions 1 Dose Pack 0    naproxen (NAPROSYN) 500 mg tablet Take 1 Tab by mouth two (2) times daily (with meals).  60 Tab 0    cyclobenzaprine (FLEXERIL) 10 mg tablet Take 1 po q Daily - TID prn spasms  Indications: Muscle Spasm 30 Tab 0    topiramate (TOPAMAX) 25 mg tablet 3 tabs bid 180 Tab 5    diazePAM (VALIUM) 2 mg tablet TK 1 T PO TID PRN  0    traMADol (ULTRAM) 50 mg tablet TK 1 T PO Q 6 H PRN  0    HYDROcodone-acetaminophen (NORCO) 5-325 mg per tablet Take 1 Tab by mouth every six (6) hours as needed for Pain. Max Daily Amount: 4 Tabs. Indications: Pain (Patient not taking: Reported on 10/11/2017) 28 Tab 0       No Known Allergies       PHYSICAL EXAMINATION    Visit Vitals    /87 (BP 1 Location: Left arm, BP Patient Position: Sitting)    Pulse 67    Ht 5' 9\" (1.753 m)    Wt 217 lb (98.4 kg)    SpO2 99%    BMI 32.05 kg/m2       CONSTITUTIONAL: NAD, A&O x 3  SENSATION: Intact to light touch throughout  RANGE OF MOTION: The patient has full passive range of motion in all four extremities. MOTOR:  Straight Leg Raise: positive, bilateral               Hip Flex Knee Ext Knee Flex Ankle DF GTE Ankle PF Tone   Right +4/5 +4/5 +4/5 +4/5 +4/5 +4/5 +4/5   Left +4/5 +4/5 +4/5 +4/5 +4/5 +4/5 +4/5       ASSESSMENT   Diagnoses and all orders for this visit:    1. Other intervertebral disc degeneration, lumbar region  -     methylPREDNISolone (MEDROL DOSEPACK) 4 mg tablet; Per dose pack instructions  -     naproxen (NAPROSYN) 500 mg tablet; Take 1 Tab by mouth two (2) times daily (with meals). -     cyclobenzaprine (FLEXERIL) 10 mg tablet; Take 1 po q Daily - TID prn spasms  Indications: Muscle Spasm    2. Radiculopathy, lumbar region  -     methylPREDNISolone (MEDROL DOSEPACK) 4 mg tablet; Per dose pack instructions  -     naproxen (NAPROSYN) 500 mg tablet; Take 1 Tab by mouth two (2) times daily (with meals). -     cyclobenzaprine (FLEXERIL) 10 mg tablet; Take 1 po q Daily - TID prn spasms  Indications: Muscle Spasm    3. Spinal stenosis of lumbar region with neurogenic claudication  -     methylPREDNISolone (MEDROL DOSEPACK) 4 mg tablet; Per dose pack instructions  -     naproxen (NAPROSYN) 500 mg tablet; Take 1 Tab by mouth two (2) times daily (with meals). -     cyclobenzaprine (FLEXERIL) 10 mg tablet; Take 1 po q Daily - TID prn spasms  Indications: Muscle Spasm    4. HNP (herniated nucleus pulposus), lumbar  -     methylPREDNISolone (MEDROL DOSEPACK) 4 mg tablet; Per dose pack instructions  -     naproxen (NAPROSYN) 500 mg tablet; Take 1 Tab by mouth two (2) times daily (with meals). -     cyclobenzaprine (FLEXERIL) 10 mg tablet; Take 1 po q Daily - TID prn spasms  Indications: Muscle Spasm          IMPRESSION AND PLAN:  I offered to refer him to physical therapy which he declined at this time. I will start him on Medrol Dosepak. I gave him a prescription for Naproxen following completion of the Medrol Dosepak. He did not need refills of Topamax today. I gave him an rx for flexeril. Patient will be OOW until f/u. I will see the patient back in 1 month's time or earlier if needed. Written by Domingo Clayton, as dictated by Antonina Byrne MD  I examined the patient, reviewed and agree with the note.

## 2018-02-12 NOTE — LETTER
NOTIFICATION OF RETURN TO WORK / SCHOOL 
 
2/12/2018 12:42 PM 
 
Mr. Roger Edward [address] [address] [address] [address] [facility] To Whom It May Concern: 
 
Roger Edward is under the care of [address] [place] [address] [facility] . He will remain out of work starting today 2/12/18 until 2/19/18. He will be returning back to work on 2/19/18. If there are questions or concerns please have the patient contact our office. Sincerely, Anita Dykes MD

## 2018-02-12 NOTE — MR AVS SNAPSHOT
[place] 
 
 
 [address] 706 [facility] 
[phone] Patient: Kelsi Henning MRN: [id number] Visit Information Date & Time Provider Department Dept. Phone Encounter #  
 2/12/2018 11:10 AM Elda Feng  [facility] [address], [address] and [facility] [place] [phone] Follow-up Instructions Return in about 4 weeks (around 3/12/2018). Your Appointments 4/11/2018 10:10 AM  
Follow Up with Dahlia Olguin NP  
[facility]) Appt Note: 6 mo follow up  lower back [address] [facility] [address]  
[address] [facility] Upcoming Health Maintenance Date Due Pneumococcal 19-64 Medium Risk (1 of 1 - PPSV23) 1/16/1990 DTaP/Tdap/Td series (1 - Tdap) 1/16/1992 Influenza Age 5 to Adult 8/1/2017 Allergies as of 2/12/2018  Review Complete On: 2/12/2018 By: Elda Feng MD  
 No Known Allergies Current Immunizations  Never Reviewed No immunizations on file. Not reviewed this visit You Were Diagnosed With   
  
 Codes Comments Other intervertebral disc degeneration, lumbar region    -  Primary ICD-10-CM: M51.36 
ICD-9-CM: 722.52 Radiculopathy, lumbar region     ICD-10-CM: M54.16 
ICD-9-CM: 724.4 Spinal stenosis of lumbar region with neurogenic claudication     ICD-10-CM: M48.062 
ICD-9-CM: 724.03   
 HNP (herniated nucleus pulposus), lumbar     ICD-10-CM: M51.26 
ICD-9-CM: 722.10 Vitals BP Pulse Height(growth percentile) Weight(growth percentile) SpO2 BMI  
 126/87 (BP 1 Location: Left arm, BP Patient Position: Sitting) 67 5' 9\" (1.753 m) 217 lb (98.4 kg) 99% 32.05 kg/m2 Smoking Status Current Every Day Smoker BMI and BSA Data Body Mass Index Body Surface Area 32.05 kg/m 2 2.19 m 2 Preferred Pharmacy Pharmacy Name Phone [facility], [address] [phone] Your Updated Medication List  
  
   
This list is accurate as of: 2/12/18 12:45 PM.  Always use your most recent med list.  
  
  
  
  
 cyclobenzaprine 10 mg tablet Commonly known as:  FLEXERIL Take 1 po q Daily - TID prn spasms  Indications: Muscle Spasm  
  
 diazePAM 2 mg tablet Commonly known as:  VALIUM TK 1 T PO TID PRN HYDROcodone-acetaminophen 5-325 mg per tablet Commonly known as:  Hugo3 Destiny Roger Take 1 Tab by mouth every six (6) hours as needed for Pain. Max Daily Amount: 4 Tabs. Indications: Pain  
  
 methylPREDNISolone 4 mg tablet Commonly known as:  Mardelmarin Layman Per dose pack instructions  
  
 naproxen 500 mg tablet Commonly known as:  NAPROSYN Take 1 Tab by mouth two (2) times daily (with meals). topiramate 25 mg tablet Commonly known as:  TOPAMAX 3 tabs bid  
  
 traMADol 50 mg tablet Commonly known as:  ULTRAM  
TK 1 T PO Q 6 H PRN Prescriptions Sent to Pharmacy Refills  
 methylPREDNISolone (MEDROL DOSEPACK) 4 mg tablet 0 Sig: Per dose pack instructions Class: Normal  
 Pharmacy: [facility] [address] [place] [address] Ph #: [phone]  
 naproxen (NAPROSYN) 500 mg tablet 0 Sig: Take 1 Tab by mouth two (2) times daily (with meals). Class: Normal  
 Pharmacy: [facility], [address] Ph #: [phone] Route: Oral  
 cyclobenzaprine (FLEXERIL) 10 mg tablet 0 Sig: Take 1 po q Daily - TID prn spasms  Indications: Muscle Spasm Class: Normal  
 Pharmacy: [address] [facility], [address] Ph #: [phone] Follow-up Instructions Return in about 4 weeks (around 3/12/2018). Introducing [facility] & HEALTH SERVICES! [facility] introduces [employer] patient portal. Now you can access parts of your medical record, email your doctor's office, and request medication refills online. 1. In your internet browser, go to [email or web address] 2. Click on the First Time User? Click Here link in the Sign In box. You will see the New Member Sign Up page. 3. Enter your Azimuth Access Code exactly as it appears below. You will not need to use this code after youve completed the sign-up process. If you do not sign up before the expiration date, you must request a new code. · Azimuth Access Code: [email or web address] Expires: 5/13/2018 10:36 AM 
 
4. Enter the last four digits of your Social Security Number (xxxx) and Date of Birth (mm/dd/yyyy) as indicated and click Submit. You will be taken to the next sign-up page. 5. Create a Azimuth ID. This will be your Azimuth login ID and cannot be changed, so think of one that is secure and easy to remember. 6. Create a Azimuth password. You can change your password at any time. 7. Enter your Password Reset Question and Answer. This can be used at a later time if you forget your password. 8. Enter your e-mail address. You will receive e-mail notification when new information is available in [address] 9. Click Sign Up. You can now view and download portions of your medical record. 10. Click the Download Summary menu link to download a portable copy of your medical information. If you have questions, please visit the Frequently Asked Questions section of the Azimuth website. Remember, Azimuth is NOT to be used for urgent needs. For medical emergencies, dial 911. Now available from your iPhone and Android! Please provide this summary of care documentation to your next provider. If you have any questions after today's visit, please call [phone].

## 2018-03-12 ENCOUNTER — OFFICE VISIT (OUTPATIENT)
Dept: ORTHOPEDIC SURGERY | Age: 48
End: 2018-03-12

## 2018-03-12 VITALS
SYSTOLIC BLOOD PRESSURE: 123 MMHG | HEIGHT: 69 IN | RESPIRATION RATE: 14 BRPM | BODY MASS INDEX: 32.56 KG/M2 | WEIGHT: 219.8 LBS | HEART RATE: 64 BPM | DIASTOLIC BLOOD PRESSURE: 81 MMHG

## 2018-03-12 DIAGNOSIS — M51.26 LUMBAR HERNIATED DISC: ICD-10-CM

## 2018-03-12 DIAGNOSIS — M54.16 RADICULOPATHY, LUMBAR REGION: ICD-10-CM

## 2018-03-12 DIAGNOSIS — M48.062 SPINAL STENOSIS OF LUMBAR REGION WITH NEUROGENIC CLAUDICATION: Primary | ICD-10-CM

## 2018-03-12 DIAGNOSIS — M51.36 OTHER INTERVERTEBRAL DISC DEGENERATION, LUMBAR REGION: ICD-10-CM

## 2018-03-12 RX ORDER — TOPIRAMATE 25 MG/1
TABLET ORAL
Qty: 90 TAB | Refills: 2 | Status: SHIPPED | OUTPATIENT
Start: 2018-03-12 | End: 2018-06-13 | Stop reason: SDUPTHER

## 2018-03-12 RX ORDER — NAPROXEN 500 MG/1
500 TABLET ORAL
Qty: 60 TAB | Refills: 0 | Status: SHIPPED | OUTPATIENT
Start: 2018-03-12 | End: 2021-05-13

## 2018-03-12 NOTE — MR AVS SNAPSHOT
[address] [facility] 
 
 
 [address] [facility] 
[phone] Patient: Leslie Penn MRN: [id number] Visit Information Date & Time Provider Department Dept. Phone Encounter #  
 3/12/2018  8:35 AM [facility]  [facility] [address] [facility] [place] [address]  Follow-up Instructions Return in about 3 months (around 6/12/2018). Upcoming Health Maintenance Date Due Pneumococcal 19-64 Medium Risk (1 of 1 - PPSV23) 1/16/1990 DTaP/Tdap/Td series (1 - Tdap) 1/16/1992 Influenza Age 5 to Adult 8/1/2017 Allergies as of 3/12/2018  Review Complete On: 3/12/2018 By: Viktor Taylor MD  
 No Known Allergies Current Immunizations  Never Reviewed No immunizations on file. Not reviewed this visit You Were Diagnosed With   
  
 Codes Comments Spinal stenosis of lumbar region with neurogenic claudication    -  Primary ICD-10-CM: K43.932 
ICD-9-CM: 724.03 Lumbar herniated disc     ICD-10-CM: M51.26 
ICD-9-CM: 722.10 Radiculopathy, lumbar region     ICD-10-CM: M54.16 
ICD-9-CM: 724.4 Other intervertebral disc degeneration, lumbar region     ICD-10-CM: M51.36 
ICD-9-CM: 722.52 Vitals BP Pulse Resp Height(growth percentile) Weight(growth percentile) BMI  
 123/81 64 14 5' 9\" (1.753 m) 219 lb 12.8 oz (99.7 kg) 32.46 kg/m2 Smoking Status Current Every Day Smoker Vitals History BMI and BSA Data Body Mass Index Body Surface Area  
 32.46 kg/m 2 2.2 m 2 Preferred Pharmacy Pharmacy Name Phone [facility], [facility] [facility] 10[address] [facility] [phone] Your Updated Medication List  
  
   
This list is accurate as of 3/12/18  8:55 AM.  Always use your most recent med list.  
  
  
  
  
 cyclobenzaprine 10 mg tablet Commonly known as:  FLEXERIL  
 Take 1 po q Daily - TID prn spasms  Indications: Muscle Spasm  
  
 diazePAM 2 mg tablet Commonly known as:  VALIUM TK 1 T PO TID PRN HYDROcodone-acetaminophen 5-325 mg per tablet Commonly known as:  Zilphia Deck Take 1 Tab by mouth every six (6) hours as needed for Pain. Max Daily Amount: 4 Tabs. Indications: Pain * naproxen 500 mg tablet Commonly known as:  NAPROSYN Take 1 Tab by mouth two (2) times daily (with meals). * naproxen 500 mg tablet Commonly known as:  NAPROSYN Take 1 Tab by mouth two (2) times daily as needed. * topiramate 25 mg tablet Commonly known as:  TOPAMAX 3 tabs bid * topiramate 25 mg tablet Commonly known as:  TOPAMAX 3 tabs PO QHS  
  
 traMADol 50 mg tablet Commonly known as:  ULTRAM  
TK 1 T PO Q 6 H PRN  
  
 * Notice: This list has 4 medication(s) that are the same as other medications prescribed for you. Read the directions carefully, and ask your doctor or other care provider to review them with you. Prescriptions Sent to Pharmacy Refills  
 naproxen (NAPROSYN) 500 mg tablet 0 Sig: Take 1 Tab by mouth two (2) times daily as needed. Class: Normal  
 Pharmacy: [address] [place] [address] Ph #: [phone] Route: Oral  
 topiramate (TOPAMAX) 25 mg tablet 2 Sig: 3 tabs PO QHS Class: Normal  
 Pharmacy: [address] [place] [address] Ph #: [phone] Follow-up Instructions Return in about 3 months (around 6/12/2018). Introducing \[facility]\"" & HEALTH SERVICES! [employer] introduces [employer] patient portal. Now you can access parts of your medical record, email your doctor's office, and request medication refills online. 1. In your internet browser, go to [email or web address]/Ri[employer]HT [employer]iN [employer]iA 2. Click on the First Time User? Click Here link in the Sign In box.  You will see the New Member Sign Up page. 3. Enter your [employer] Access Code exactly as it appears below. You will not need to use this code after youve completed the sign-up process. If you do not sign up before the expiration date, you must request a new code. · [employer] Access Code: [email or web address] Expires: 5/13/2018 11:36 AM 
 
4. Enter the last four digits of your Social Security Number (xxxx) and Date of Birth (mm/dd/yyyy) as indicated and click Submit. You will be taken to the next sign-up page. 5. Create a [employer] ID. This will be your [employer] login ID and cannot be changed, so think of one that is secure and easy to remember. 6. Create a [employer] password. You can change your password at any time. 7. Enter your Password Reset Question and Answer. This can be used at a later time if you forget your password. 8. Enter your e-mail address. You will receive e-mail notification when new information is available in [address] 9. Click Sign Up. You can now view and download portions of your medical record. 10. Click the Download Summary menu link to download a portable copy of your medical information. If you have questions, please visit the Frequently Asked Questions section of the [employer] website. Remember, [employer] is NOT to be used for urgent needs. For medical emergencies, dial 911. Now available from your iPhone and Android! Please provide this summary of care documentation to your next provider. If you have any questions after today's visit, please call [phone].

## 2018-03-12 NOTE — LETTER
NOTIFICATION OF RETURN TO WORK / SCHOOL 
 
3/12/2018 8:54 AM 
 
Mr. Agustina Chow [address] [address] [address] [address] Clare Tovar To Whom It May Concern: 
 
Agustina Chow was under the care of [address] was seen today 3-12-18. If you have any questions please call the office at [phone]. Sincerely, Nancy Perry MD

## 2018-03-12 NOTE — PROGRESS NOTES
[facility]  [address], [address]  Phone: [phone]  Fax: [phone]        PROGRESS NOTE      HISTORY OF PRESENT ILLNESS:  The patient is a 53 y.o. male and was seen today for follow up of low back pain into left hip with LLE tingling in an L5 distribution to the calf. He denies RLE symptoms. He reports LLE symptoms are more severe than low back pain. Pt states symptoms have improved since onset. More recently, patient c/o LLE numbness extending in an S1 distribution to the ankle. He states all positions for prolonged periods exacerbate his pain. He is on full duty at work. Note from Caitlin Willard [address], [address] dated 5/15/16 patient was seen with c/o low back into BLE x 2 weeks. Of note, onset of pain after lifting 50 lbs at work. At that time, he was treated with Prednisone and Flexeril with significant relief. He has also taken Aleve with slight relief. I last saw patient on 9/23/16 who was to f/u in 3 month's time but failed to do so. Pt more recently saw my nurse practitioner on 7/26/17. 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 Pt was given a course of oral steroids on 7/10/17 after he reported to the ER after his work-related injury. Vincent Welsh NP restarted patient on Topamax 25 mg 3 tabs ramped and gave him a Rx for Norco 5-325 mg on 7/26/17. Pt denies change in bowel or bladder habits. Pt denies fever, weight loss, or skin changes. He denies previous lumbar spinal surgery or blocks. He has not attended physical therapy/chiropractor. He denies hx of glaucoma or DM. Noted, patient is right-hand dominant and a smoker. Lumbosacral spine x-ray report dated 5/15/16 reviewed. Per report, mild L5-S1 disc space narrowing and facet hypertrophy. Lumbar spine MRI dated 7/28/17 reviewed.  Per report, only very minimal interval decrease in seize of the still moderate left paracentral extruded L5-S1 disc herniation since prior exam 9/2016. Persistent stable flattening of the left ventral lateral thecal sac and impingement on the left traversing S1 root. Minimal developmental lumbar canal stenosis. Mild L4-5 central stenosis from additional mild disc bulge. Mild bilateral L4-5, minimal bilateral L5-S1 foraminal stenosis. Minimal bilateral L4-5, L5-S1 facet joint osteoarthritis. At his last clinical appointment, I offered to refer him to physical therapy which he declined at that time. I started him on Medrol Dosepak. I gave him a prescription for Naproxen following completion of the Medrol Dosepak. He did not need refills of Topamax today. I gave him an Rx for Flexeril. The patient returns today with pain location and distribution remain unchanged. He rates pain 0/10, an improvement since his last visit (6/10). Pt completed MDP with significant relief. He is compliant with Topamax 75 mg qhs. Pt d/c'd Flexeril but continues to take Naproxen 500 mg BID prn.  reviewed. Body mass index is 32.46 kg/(m^2). History reviewed. No pertinent past medical history. Social History     Social History    Marital status:      Spouse name: N/A    Number of children: N/A    Years of education: N/A     Occupational History    Not on file. Social History Main Topics    Smoking status: Current Every Day Smoker     Packs/day: 0.25    Smokeless tobacco: Never Used    Alcohol use 7.2 oz/week     12 Cans of beer per week    Drug use: No    Sexual activity: Not on file     Other Topics Concern    Not on file     Social History Narrative       Current Outpatient Prescriptions   Medication Sig Dispense Refill    naproxen (NAPROSYN) 500 mg tablet Take 1 Tab by mouth two (2) times daily as needed. 60 Tab 0    topiramate (TOPAMAX) 25 mg tablet 3 tabs PO QHS 90 Tab 2    naproxen (NAPROSYN) 500 mg tablet Take 1 Tab by mouth two (2) times daily (with meals).  60 Tab 0    topiramate (TOPAMAX) 25 mg tablet 3 tabs bid 180 Tab 5    cyclobenzaprine (FLEXERIL) 10 mg tablet Take 1 po q Daily - TID prn spasms  Indications: Muscle Spasm (Patient not taking: Reported on 3/12/2018) 30 Tab 0    diazePAM (VALIUM) 2 mg tablet TK 1 T PO TID PRN  0    traMADol (ULTRAM) 50 mg tablet TK 1 T PO Q 6 H PRN  0    HYDROcodone-acetaminophen (NORCO) 5-325 mg per tablet Take 1 Tab by mouth every six (6) hours as needed for Pain. Max Daily Amount: 4 Tabs. Indications: Pain (Patient not taking: Reported on 10/11/2017) 28 Tab 0       No Known Allergies       PHYSICAL EXAMINATION    Visit Vitals    /81    Pulse 64    Resp 14    Ht 5' 9\" (1.753 m)    Wt 219 lb 12.8 oz (99.7 kg)    BMI 32.46 kg/m2       CONSTITUTIONAL: NAD, A&O x 3  SENSATION: Intact to light touch throughout  RANGE OF MOTION: The patient has full passive range of motion in all four extremities. MOTOR:  Straight Leg Raise: Negative, bilateral               Hip Flex Knee Ext Knee Flex Ankle DF GTE Ankle PF Tone   Right +4/5 +4/5 +4/5 +4/5 +4/5 +4/5 +4/5   Left +4/5 +4/5 +4/5 +4/5 +4/5 +4/5 +4/5       ASSESSMENT   Diagnoses and all orders for this visit:    1. Spinal stenosis of lumbar region with neurogenic claudication    2. Lumbar herniated disc    3. Radiculopathy, lumbar region    4. Other intervertebral disc degeneration, lumbar region    Other orders  -     naproxen (NAPROSYN) 500 mg tablet; Take 1 Tab by mouth two (2) times daily as needed. -     topiramate (TOPAMAX) 25 mg tablet; 3 tabs PO QHS          IMPRESSION AND PLAN:  Patient reports improvement in symptoms with oral steroids and NSAIDs. He wishes to continue his current treatment regimen. I will refill his Topamax 75 mg qhs and Naproxen 500 mg BID prn. I will see the patient back in 3 month's time or earlier if needed. Written by Susan Olmedo, as dictated by Delma Garcia MD  I examined the patient, reviewed and agree with the note.

## 2018-06-13 ENCOUNTER — OFFICE VISIT (OUTPATIENT)
Dept: ORTHOPEDIC SURGERY | Age: 48
End: 2018-06-13

## 2018-06-13 VITALS
BODY MASS INDEX: 31.84 KG/M2 | WEIGHT: 215 LBS | DIASTOLIC BLOOD PRESSURE: 69 MMHG | HEART RATE: 62 BPM | HEIGHT: 69 IN | SYSTOLIC BLOOD PRESSURE: 109 MMHG

## 2018-06-13 DIAGNOSIS — M51.26 LUMBAR HERNIATED DISC: ICD-10-CM

## 2018-06-13 DIAGNOSIS — M51.36 DDD (DEGENERATIVE DISC DISEASE), LUMBAR: Primary | ICD-10-CM

## 2018-06-13 DIAGNOSIS — M48.062 SPINAL STENOSIS OF LUMBAR REGION WITH NEUROGENIC CLAUDICATION: ICD-10-CM

## 2018-06-13 RX ORDER — TOPIRAMATE 25 MG/1
TABLET ORAL
Qty: 180 TAB | Refills: 5 | Status: SHIPPED | OUTPATIENT
Start: 2018-06-13 | End: 2019-02-13 | Stop reason: SDUPTHER

## 2018-06-13 NOTE — PATIENT INSTRUCTIONS
Learning About How to Have a Healthy Back  What causes back pain? Back pain is often caused by overuse, strain, or injury. For example, people often hurt their backs playing sports or working in the yard, being jolted in a car accident, or lifting something too heavy. Aging plays a part too. Your bones and muscles tend to lose strength as you age, which makes injury more likely. The spongy discs between the bones of the spine (vertebrae) may suffer from wear and tear and no longer provide enough cushion between the bones. A disc that bulges or breaks open (herniated disc) can press on nerves, causing back pain. In some people, back pain is the result of arthritis, broken vertebrae caused by bone loss (osteoporosis), illness, or a spine problem. Although most people have back pain at one time or another, there are steps you can take to make it less likely. How can you have a healthy back? Reduce stress on your back through good posture  Slumping or slouching alone may not cause low back pain. But after the back has been strained or injured, bad posture can make pain worse. · Sleep in a position that maintains your back's normal curves and on a mattress that feels comfortable. Sleep on your side with a pillow between your knees, or sleep on your back with a pillow under your knees. These positions can reduce strain on your back. · Stand and sit up straight. \"Good posture\" generally means your ears, shoulders, and hips are in a straight line. · If you must stand for a long time, put one foot on a stool, ledge, or box. Switch feet every now and then. · Sit in a chair that is low enough to let you place both feet flat on the floor with both knees nearly level with your hips. If your chair or desk is too high, use a footrest to raise your knees. Place a small pillow, a rolled-up towel, or a lumbar roll in the curve of your back if you need extra support.   · Try a kneeling chair, which helps tilt your hips forward. This takes pressure off your lower back. · Try sitting on an exercise ball. It can rock from side to side, which helps keep your back loose. · When driving, keep your knees nearly level with your hips. Sit straight, and drive with both hands on the steering wheel. Your arms should be in a slightly bent position. Reduce stress on your back through careful lifting  · Squat down, bending at the hips and knees only. If you need to, put one knee to the floor and extend your other knee in front of you, bent at a right angle (half kneeling). · Press your chest straight forward. This helps keep your upper back straight while keeping a slight arch in your low back. · Hold the load as close to your body as possible, at the level of your belly button (navel). · Use your feet to change direction, taking small steps. · Lead with your hips as you change direction. Keep your shoulders in line with your hips as you move. · Set down your load carefully, squatting with your knees and hips only. Exercise and stretch your back  · Do some exercise on most days of the week, if your doctor says it is okay. You can walk, run, swim, or cycle. · Stretch your back muscles. Here are a few exercises to try:  Terressa Gutter on your back, and gently pull one bent knee to your chest. Put that foot back on the floor, and then pull the other knee to your chest.  ¨ Do pelvic tilts. Lie on your back with your knees bent. Tighten your stomach muscles. Pull your belly button (navel) in and up toward your ribs. You should feel like your back is pressing to the floor and your hips and pelvis are slightly lifting off the floor. Hold for 6 seconds while breathing smoothly. ¨ Sit with your back flat against a wall. · Keep your core muscles strong. The muscles of your back, belly (abdomen), and buttocks support your spine. ¨ Pull in your belly and imagine pulling your navel toward your spine. Hold this for 6 seconds, then relax.  Remember to keep breathing normally as you tense your muscles. ¨ Do curl-ups. Always do them with your knees bent. Keep your low back on the floor, and curl your shoulders toward your knees using a smooth, slow motion. Keep your arms folded across your chest. If this bothers your neck, try putting your hands behind your neck (not your head), with your elbows spread apart. ¨ Lie on your back with your knees bent and your feet flat on the floor. Tighten your belly muscles, and then push with your feet and raise your buttocks up a few inches. Hold this position 6 seconds as you continue to breathe normally, then lower yourself slowly to the floor. Repeat 8 to 12 times. ¨ If you like group exercise, try Pilates or yoga. These classes have poses that strengthen the core muscles. Lead a healthy lifestyle  · Stay at a healthy weight to avoid strain on your back. · Do not smoke. Smoking increases the risk of osteoporosis, which weakens the spine. If you need help quitting, talk to your doctor about stop-smoking programs and medicines. These can increase your chances of quitting for good. Where can you learn more? Go to http://theresa-uzma.info/. Enter L315 in the search box to learn more about \"Learning About How to Have a Healthy Back. \"  Current as of: March 21, 2017  Content Version: 11.4  © 7323-6584 Healthwise, Incorporated. Care instructions adapted under license by [employer] (which disclaims liability or warranty for this information). If you have questions about a medical condition or this instruction, always ask your healthcare professional. [place] [address] any warranty or liability for your use of this information.

## 2018-06-13 NOTE — LETTER
NOTIFICATION OF RETURN TO WORK / SCHOOL 
 
6/13/2018 8:47 AM 
 
Mr. Uziel Ash [address] [address] [address] [address] Rosaline Bowman To Whom It May Concern: 
 
Uziel Ash was under the care of [address] today 6-13-18. If you have any questions please call the office at [phone]. Sincerely, Benji Smith NP

## 2018-06-13 NOTE — PROGRESS NOTES
Chief complaint/History of Present Illness:  Chief Complaint   Patient presents with    Follow-up     lumbar LLE pain     HPI  Jyoti Knapp is a  53 y.o.  male      HISTORY OF PRESENT ILLNESS:  The patient comes in today for followup of his low back pain with radiating left lower extremity pain down to his ankle. Usually, leg pain is worse than back pain. He states he is doing well. He rates his pain as a 0/10 and that Topamax 75 mg b.i.d. 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 He has come off the naproxen 500 mg b.i.d. He states Dr. Mellie Bernheim told him to try to minimize that due to it can cause stomach issues, so he has just stopped taking it. He does not really feel like he needs it, as the Topamax works so well. He has lost about 4 pounds in the last 3 months since we have seen him, but states he just not eat as much and has changed the way he eats to try to be healthier. He denies fever, bowel or bladder dysfunction. No new medical issues. PHYSICAL EXAMINATION:  Mr. Grupo Tabares is a 64-year-old male. He is alert and oriented. Normal mood and affect. He has a full weightbearing, nonantalgic gait. No assistive device. Has 4/5 strength in bilateral lower extremities. Negative straight leg raise. No pain with hyperextension of the lumbar spine. ASSESSMENT/PLAN:  This is a patient with a left paracentral disc extrusion at L5-S1 and spinal stenosis at L4-L5. His pain is well controlled on the Topamax 75 mg b.i.d.  I will refill that for him. We did discuss weight loss with Topamax and if he finds he is losing excessive amounts of weight, he will let us know and we will have to scale back on that. We will see him back in 6 months, sooner if needed. If he is still doing well and stable, then we can extend that to a once-a-year visit.          Review of systems:    History reviewed. No pertinent past medical history. History reviewed. No pertinent surgical history. Social History     Social History    Marital status:      Spouse name: N/A    Number of children: N/A    Years of education: N/A     Occupational History    Not on file. Social History Main Topics    Smoking status: Current Every Day Smoker     Packs/day: 0.25    Smokeless tobacco: Never Used    Alcohol use 7.2 oz/week     12 Cans of beer per week    Drug use: No    Sexual activity: Not on file     Other Topics Concern    Not on file     Social History Narrative     Family History   Problem Relation Age of Onset    No Known Problems Mother     No Known Problems Father        Physical Exam:  Visit Vitals    /69    Pulse 62    Ht 5' 9\" (1.753 m)    Wt 215 lb (97.5 kg)    BMI 31.75 kg/m2     Pain Scale: 0 - No pain/10       has been . reviewed and is appropriate        Diagnoses and all orders for this visit:    1. DDD (degenerative disc disease), lumbar    2. Lumbar herniated disc    3. Spinal stenosis of lumbar region with neurogenic claudication    Other orders  -     topiramate (TOPAMAX) 25 mg tablet; 3 tabs PO bid            Follow-up Disposition:  Return in about 6 months (around 12/13/2018) for with Kasey.         We have informed Jorge L Dixon to notify us for immediate appointment if he has any worsening neurogical symptoms or if an emergency situation presents, then call 911

## 2019-02-04 RX ORDER — TOPIRAMATE 25 MG/1
TABLET ORAL
Qty: 180 TAB | Refills: 5 | OUTPATIENT
Start: 2019-02-04

## 2019-02-04 NOTE — TELEPHONE ENCOUNTER
Last Visit: 6/13  Next Appt:  initiated cancellation 12/18  Previous Refill Encounter: 6/+5    Requested Prescriptions     Pending Prescriptions Disp Refills    topiramate (TOPAMAX) 25 mg tablet 180 Tab 5     Sig: 3 tabs PO bid

## 2019-02-13 ENCOUNTER — OFFICE VISIT (OUTPATIENT)
Dept: ORTHOPEDIC SURGERY | Age: 49
End: 2019-02-13

## 2019-02-13 VITALS
HEART RATE: 70 BPM | SYSTOLIC BLOOD PRESSURE: 114 MMHG | DIASTOLIC BLOOD PRESSURE: 76 MMHG | WEIGHT: 218.6 LBS | OXYGEN SATURATION: 97 % | RESPIRATION RATE: 18 BRPM | HEIGHT: 69 IN | BODY MASS INDEX: 32.38 KG/M2 | TEMPERATURE: 97.8 F

## 2019-02-13 DIAGNOSIS — M51.26 LUMBAR HERNIATED DISC: ICD-10-CM

## 2019-02-13 DIAGNOSIS — M48.062 SPINAL STENOSIS OF LUMBAR REGION WITH NEUROGENIC CLAUDICATION: Primary | ICD-10-CM

## 2019-02-13 DIAGNOSIS — M54.2 NECK PAIN: ICD-10-CM

## 2019-02-13 RX ORDER — TOPIRAMATE 25 MG/1
TABLET ORAL
Qty: 180 TAB | Refills: 5 | Status: SHIPPED | OUTPATIENT
Start: 2019-02-13 | End: 2021-05-13

## 2019-02-13 RX ORDER — NAPROXEN 500 MG/1
500 TABLET ORAL 2 TIMES DAILY WITH MEALS
Qty: 60 TAB | Refills: 1 | Status: SHIPPED | OUTPATIENT
Start: 2019-02-13 | End: 2021-05-13

## 2019-02-13 NOTE — LETTER
NOTIFICATION OF RETURN TO WORK / SCHOOL 
 
2/13/2019 10:51 AM 
 
Mr. Anabel Cunningham [address] [id number] [address] [address] Darya Duran To Whom It May Concern: 
 
Anabel Cunningham was under the care of [address] today 2-13-19. If you have any questions please call the office at [phone]. Sincerely, Masha More NP

## 2019-02-13 NOTE — PATIENT INSTRUCTIONS
Back Pain: Care Instructions  Your Care Instructions    Back pain has many possible causes. It is often related to problems with muscles and ligaments of the back. It may also be related to problems with the nerves, discs, or bones of the back. Moving, lifting, standing, sitting, or sleeping in an awkward way can strain the back. Sometimes you don't notice the injury until later. Arthritis is another common cause of back pain. Although it may hurt a lot, back pain usually improves on its own within several weeks. Most people recover in 12 weeks or less. Using good home treatment and being careful not to stress your back can help you feel better sooner. Follow-up care is a key part of your treatment and safety. Be sure to make and go to all appointments, and call your doctor if you are having problems. It's also a good idea to know your test results and keep a list of the medicines you take. How can you care for yourself at home? · Sit or lie in positions that are most comfortable and reduce your pain. Try one of these positions when you lie down:  ? Lie on your back with your knees bent and supported by large pillows. ? Lie on the floor with your legs on the seat of a sofa or chair. ? Lie on your side with your knees and hips bent and a pillow between your legs. ? Lie on your stomach if it does not make pain worse. · Do not sit up in bed, and avoid soft couches and twisted positions. Bed rest can help relieve pain at first, but it delays healing. Avoid bed rest after the first day of back pain. · Change positions every 30 minutes. If you must sit for long periods of time, take breaks from sitting. Get up and walk around, or lie in a comfortable position. · Try using a heating pad on a low or medium setting for 15 to 20 minutes every 2 or 3 hours. Try a warm shower in place of one session with the heating pad. · You can also try an ice pack for 10 to 15 minutes every 2 to 3 hours.  Put a thin cloth between the ice pack and your skin. · Take pain medicines exactly as directed. ? If the doctor gave you a prescription medicine for pain, take it as prescribed. ? If you are not taking a prescription pain medicine, ask your doctor if you can take an over-the-counter medicine. · Take short walks several times a day. You can start with 5 to 10 minutes, 3 or 4 times a day, and work up to longer walks. Walk on level surfaces and avoid hills and stairs until your back is better. · Return to work and other activities as soon as you can. Continued rest without activity is usually not good for your back. · To prevent future back pain, do exercises to stretch and strengthen your back and stomach. Learn how to use good posture, safe lifting techniques, and proper body mechanics. When should you call for help? Call your doctor now or seek immediate medical care if:    · You have new or worsening numbness in your legs.     · You have new or worsening weakness in your legs. (This could make it hard to stand up.)     · You lose control of your bladder or bowels.    Watch closely for changes in your health, and be sure to contact your doctor if:    · You have a fever, lose weight, or don't feel well.     · You do not get better as expected. Where can you learn more? Go to http://theresa-uzma.info/. Enter N550 in the search box to learn more about \"Back Pain: Care Instructions. \"  Current as of: September 20, 2018  Content Version: 11.9  © 6774-0234 [employer]. Care instructions adapted under license by [employer] (which disclaims liability or warranty for this information). If you have questions about a medical condition or this instruction, always ask your healthcare professional. [place] [address] any warranty or liability for your use of this information.

## 2019-02-13 NOTE — PROGRESS NOTES
Chief complaint/History of Present Illness:  Chief Complaint   Patient presents with    Back Pain     Follow up and med refill    Leg Pain     LLE    Neck Pain     neck pain, intermittently, left side, 4/10 pain level, throbbing    Shoulder Pain     left shoulder     HPI  Brandon Moe is a  51 y.o.  male      HISTORY OF PRESENT ILLNESS:  This is patient who comes in today for follow-up of his chronic low back pain with radiating left lower extremity pain down to his ankle. He is not having any leg pain. The Topamax 75 mg b.i.d. helps with that and he states his back is doing good. He does have a complaint of some neck pain that started 1 to 2 weeks ago after he fell asleep in his chair and his head fell to the left and it caused him to jerk. He has neck pain that radiates on the left to the left shoulder. He tried using [employer] on it which did not help. He denies dropping things. No balance dysfunction. He denies fever or bowel or bladder dysfunction. No new medical issues. The patient works at [employer]. He smokes a third of a pack of cigarettes per day. PHYSICAL EXAMINATION:  Mr. Heydi Alvarez is a 15-year-old male. He is alert and oriented with normal mood and affect. He has a full weightbearing nonantalgic gait. Normal tandem gait. He has 4/5 strength bilateral upper and lower extremities. Negative straight leg raise. Negative Patrick. No pain with hyperextension lumbar spine. ASSESSMENT/PLAN:  This is a patient with a left paracentral disc protrusion L5-S1 with spinal stenosis at L4-5. He is having some new onset neck pain. We are going to try some Naprosyn 500 mg b.i.d. for about 10 days and then he can go to p.r.n. If he does not improve, he will come back and we will work up studies for his neck. I have refilled his Topamax. We will see him back in 6 months, sooner if needed. Review of systems:    History reviewed.  No pertinent past medical history. History reviewed. No pertinent surgical history. Social History     Socioeconomic History    Marital status:      Spouse name: Not on file    Number of children: Not on file    Years of education: Not on file    Highest education level: Not on file   Social Needs    Financial resource strain: Not on file    Food insecurity - worry: Not on file    Food insecurity - inability: Not on file    Transportation needs - medical: Not on file   NavigatorMD needs - non-medical: Not on file   Occupational History    Not on file   Tobacco Use    Smoking status: Current Every Day Smoker     Packs/day: 0.25    Smokeless tobacco: Never Used   Substance and Sexual Activity    Alcohol use: Yes     Alcohol/week: 7.2 oz     Types: 12 Cans of beer per week    Drug use: No    Sexual activity: Not on file   Other Topics Concern    Not on file   Social History Narrative    Not on file     Family History   Problem Relation Age of Onset    No Known Problems Mother     No Known Problems Father        Physical Exam:  Visit Vitals  /76   Pulse 70   Temp 97.8 °F (36.6 °C) (Oral)   Resp 18   Ht 5' 9\" (1.753 m)   Wt 218 lb 9.6 oz (99.2 kg)   SpO2 97%   BMI 32.28 kg/m²     Pain Scale: /10       has been . reviewed and is appropriate          Diagnoses and all orders for this visit:    1. Spinal stenosis of lumbar region with neurogenic claudication  -     topiramate (TOPAMAX) 25 mg tablet; 3 tabs PO bid    2. Lumbar herniated disc  -     topiramate (TOPAMAX) 25 mg tablet; 3 tabs PO bid    3. Neck pain  -     naproxen (NAPROSYN) 500 mg tablet; Take 1 Tab by mouth two (2) times daily (with meals). Follow-up Disposition:  Return in about 6 months (around 8/13/2019) for with NP.         We have informed Damiancamila Castroskcarolina to notify us for immediate appointment if he has any worsening neurogical symptoms or if an emergency situation presents, then call 911

## 2021-05-12 ENCOUNTER — HOSPITAL ENCOUNTER (OUTPATIENT)
Dept: LAB | Age: 51
Discharge: HOME | End: 2021-05-12

## 2021-05-12 LAB — SENTARA SPECIMEN COL,SENBCF: NORMAL

## 2021-05-12 PROCEDURE — 99001 SPECIMEN HANDLING PT-LAB: CPT

## 2021-05-13 RX ORDER — PANTOPRAZOLE SODIUM 40 MG/1
40 TABLET, DELAYED RELEASE ORAL 2 TIMES DAILY
COMMUNITY

## 2021-05-13 RX ORDER — CETIRIZINE HCL 10 MG
TABLET ORAL
COMMUNITY

## 2021-05-14 ENCOUNTER — ANESTHESIA EVENT (OUTPATIENT)
Dept: ENDOSCOPY | Age: 51
End: 2021-05-14
Payer: COMMERCIAL

## 2021-05-17 ENCOUNTER — HOSPITAL ENCOUNTER (OUTPATIENT)
Age: 51
Setting detail: OUTPATIENT SURGERY
Discharge: HOME | End: 2021-05-17
Attending: INTERNAL MEDICINE | Admitting: INTERNAL MEDICINE
Payer: COMMERCIAL

## 2021-05-17 ENCOUNTER — ANESTHESIA (OUTPATIENT)
Dept: ENDOSCOPY | Age: 51
End: 2021-05-17
Payer: COMMERCIAL

## 2021-05-17 PROCEDURE — 76060000031 HC ANESTHESIA FIRST 0.5 HR: Performed by: INTERNAL MEDICINE

## 2021-05-17 PROCEDURE — 74011250636 HC RX REV CODE- 250/636: Performed by: NURSE ANESTHETIST, CERTIFIED REGISTERED

## 2021-05-17 PROCEDURE — 2709999900 HC NON-CHARGEABLE SUPPLY: Performed by: INTERNAL MEDICINE

## 2021-05-17 PROCEDURE — 74011250636 HC RX REV CODE- 250/636: Performed by: ANESTHESIOLOGY

## 2021-05-17 PROCEDURE — 76040000019: Performed by: INTERNAL MEDICINE

## 2021-05-17 PROCEDURE — 74011000250 HC RX REV CODE- 250: Performed by: ANESTHESIOLOGY

## 2021-05-17 PROCEDURE — 77030019988 HC FCPS ENDOSC DISP BSC -B: Performed by: INTERNAL MEDICINE

## 2021-05-17 PROCEDURE — 77030008565 HC TBNG SUC IRR ERBE -B: Performed by: INTERNAL MEDICINE

## 2021-05-17 PROCEDURE — 00731 ANES UPR GI NDSC PX NOS: CPT | Performed by: ANESTHESIOLOGY

## 2021-05-17 RX ORDER — LIDOCAINE HYDROCHLORIDE 20 MG/ML
INJECTION, SOLUTION EPIDURAL; INFILTRATION; INTRACAUDAL; PERINEURAL AS NEEDED
Status: DISCONTINUED | OUTPATIENT
Start: 2021-05-17 | End: 2021-05-17 | Stop reason: HOSPADM

## 2021-05-17 RX ORDER — PROPOFOL 10 MG/ML
INJECTION, EMULSION INTRAVENOUS AS NEEDED
Status: DISCONTINUED | OUTPATIENT
Start: 2021-05-17 | End: 2021-05-17 | Stop reason: HOSPADM

## 2021-05-17 RX ORDER — SODIUM CHLORIDE 0.9 % (FLUSH) 0.9 %
5-40 SYRINGE (ML) INJECTION AS NEEDED
Status: DISCONTINUED | OUTPATIENT
Start: 2021-05-17 | End: 2021-05-17 | Stop reason: HOSPADM

## 2021-05-17 RX ORDER — SODIUM CHLORIDE 0.9 % (FLUSH) 0.9 %
5-40 SYRINGE (ML) INJECTION EVERY 8 HOURS
Status: DISCONTINUED | OUTPATIENT
Start: 2021-05-17 | End: 2021-05-17 | Stop reason: HOSPADM

## 2021-05-17 RX ORDER — FENTANYL CITRATE 50 UG/ML
INJECTION, SOLUTION INTRAMUSCULAR; INTRAVENOUS AS NEEDED
Status: DISCONTINUED | OUTPATIENT
Start: 2021-05-17 | End: 2021-05-17 | Stop reason: HOSPADM

## 2021-05-17 RX ORDER — SODIUM CHLORIDE, SODIUM LACTATE, POTASSIUM CHLORIDE, CALCIUM CHLORIDE 600; 310; 30; 20 MG/100ML; MG/100ML; MG/100ML; MG/100ML
50 INJECTION, SOLUTION INTRAVENOUS CONTINUOUS
Status: DISCONTINUED | OUTPATIENT
Start: 2021-05-18 | End: 2021-05-17 | Stop reason: HOSPADM

## 2021-05-17 RX ORDER — FAMOTIDINE 10 MG/ML
INJECTION INTRAVENOUS
Status: DISCONTINUED
Start: 2021-05-17 | End: 2021-05-17 | Stop reason: HOSPADM

## 2021-05-17 RX ORDER — FAMOTIDINE 20 MG/1
20 TABLET, FILM COATED ORAL ONCE
Status: DISCONTINUED | OUTPATIENT
Start: 2021-05-17 | End: 2021-05-17

## 2021-05-17 RX ADMIN — SODIUM CHLORIDE, SODIUM LACTATE, POTASSIUM CHLORIDE, AND CALCIUM CHLORIDE 50 ML/HR: 600; 310; 30; 20 INJECTION, SOLUTION INTRAVENOUS at 11:06

## 2021-05-17 RX ADMIN — LIDOCAINE HYDROCHLORIDE 80 MG: 20 INJECTION, SOLUTION EPIDURAL; INFILTRATION; INTRACAUDAL; PERINEURAL at 11:16

## 2021-05-17 RX ADMIN — SODIUM CHLORIDE, SODIUM LACTATE, POTASSIUM CHLORIDE, AND CALCIUM CHLORIDE: 600; 310; 30; 20 INJECTION, SOLUTION INTRAVENOUS at 11:11

## 2021-05-17 RX ADMIN — FENTANYL CITRATE 50 MCG: 50 INJECTION, SOLUTION INTRAMUSCULAR; INTRAVENOUS at 11:16

## 2021-05-17 RX ADMIN — FENTANYL CITRATE 50 MCG: 50 INJECTION, SOLUTION INTRAMUSCULAR; INTRAVENOUS at 11:19

## 2021-05-17 RX ADMIN — PROPOFOL 30 MG: 10 INJECTION, EMULSION INTRAVENOUS at 11:21

## 2021-05-17 RX ADMIN — PROPOFOL 50 MG: 10 INJECTION, EMULSION INTRAVENOUS at 11:18

## 2021-05-17 RX ADMIN — PROPOFOL 50 MG: 10 INJECTION, EMULSION INTRAVENOUS at 11:16

## 2021-05-17 RX ADMIN — PROPOFOL 50 MG: 10 INJECTION, EMULSION INTRAVENOUS at 11:17

## 2021-05-17 RX ADMIN — FAMOTIDINE 20 MG: 10 INJECTION INTRAVENOUS at 11:06

## 2021-05-17 NOTE — H&P
Chief Complaint: GERD    History of present illness: Symptoms on medications. PMH:   Past Medical History:   Diagnosis Date    GERD (gastroesophageal reflux disease)      Allergies: No Known Allergies  Medications: No current facility-administered medications for this encounter. FH:   Family History   Problem Relation Age of Onset    No Known Problems Mother     No Known Problems Father      Social:   Social History     Socioeconomic History    Marital status:      Spouse name: Not on file    Number of children: Not on file    Years of education: Not on file    Highest education level: Not on file   Tobacco Use    Smoking status: Former Smoker     Packs/day: 0.25    Smokeless tobacco: Never Used    Tobacco comment: quit 2020   Substance and Sexual Activity    Alcohol use: Yes     Alcohol/week: 12.0 standard drinks     Types: 12 Cans of beer per week    Drug use: No     Surgical H:   Past Surgical History:   Procedure Laterality Date    IN EGD DELIVER THERMAL ENERGY SPHNCTR/CARDIA GERD         ROS: positive for reflux symptoms    Physical Exam:   Visit Vitals  Ht 5' 9\" (1.753 m)   Wt 102.1 kg (225 lb)   BMI 33.23 kg/m²     General appearance: alert, no distress  Eyes: pupils equal and reactive, extraocular eye movements intact  Nodes: No gross adenopathy in neck. Skin: no spider angiomata, jaundice, palmar erythema   Respiratory: clear to auscultation bilaterally  Cardiovascular: regular heart rate, no murmurs, no JVD, normal rate and regular rhythm  Abdomen: soft, non-tender, liver not enlarged, spleen not palpable, no obvious ascites  Extremities: no muscle wasting, no gross arthritic changes  Neurologic: alert and oriented, cranial nerves grossly intact, no asterixis    Labs: No results found for this or any previous visit (from the past 24 hour(s)). Imp/ Plan: Will proceed with EGD with Bravo as planned.  Risk benefits alternative including but not limited to infection, bleeding, perforation of viscous, allergic reaction and resultant morbidity and mortality was discussed. Chance of missing a significant lesion due to various reasons were discussed.       Carolina Arroyo MD  [facility] [address], [address]

## 2021-05-17 NOTE — PERIOP NOTES
.  [address]. May 17, 2021       RE: Muna Gonazles      To Whom It May Concern,    This is to certify that Muna Gonzales may may return to work on 5/18/2021. Please feel free to contact my office if you have any questions or concerns. Thank you for your assistance in this matter.       Sincerely,  Karina Hsu RN

## 2021-05-17 NOTE — PERIOP NOTES
Pre-Op Summary    Pt arrived via car with family/friend and is oriented to time, place, person and situation. Patient with steady gait with none assistive devices. Visit Vitals  /82   Pulse 62   Temp 98.2 °F (36.8 °C)   Resp 16   Ht 5' 9\" (1.753 m)   Wt 99.3 kg (219 lb)   SpO2 98%   BMI 32.34 kg/m²       Peripheral IV located on Left hand . Patients belongings are located locked in unit. Patient's point of contact is Lora Cunningham, wife and their contact number is: [phone]. They will be in the waiting room. They are able to receive medication information. They will be their ride home. Bro in law is driving.

## 2021-05-17 NOTE — PROCEDURES
[facility]  [facility], [address]      Brief Procedure Note    Sandi Yusuf  1970  [id number]    Date of Procedure: 5/17/2021    Preoperative diagnosis: Reflux:  K21.9  Vomiting,  [R11.10]    Postoperative diagnosis:  GERD With BRAVO clip placement    Type of Anesthesia: MAC (monitered anesthesia care)    Description of Findings: same as post op dx    Procedure: Procedure(s):  UPPER ENDOSCOPY / Manual Greet    :  Dr. Lazara Caldwell MD    Assistant(s): [unfilled]    Type of Anesthesia:MAC     EBL:None,    Implants: Bravo pH probe placed 6 cm above the EG junction which was at 41 cms    Specimens: None  Findings: See printed and scanned procedure note    Complications: None    Dr. Lazara Caldwell MD  5/17/2021  11:27 AM

## 2021-05-17 NOTE — DISCHARGE INSTRUCTIONS
Patient Education        Upper GI Endoscopy: What to Expect at [address] had an upper GI endoscopy. Your doctor used a thin, lighted tube that bends to look at the inside of your esophagus, your stomach, and the first part of the small intestine, called the duodenum. After you have an endoscopy, you will stay at the hospital or clinic for 1 to 2 hours. This will allow the medicine to wear off. You will be able to go home after your doctor or nurse checks to make sure that you're not having any problems. You may have to stay overnight if you had treatment during the test. You may have a sore throat for a day or two after the test.  This care sheet gives you a general idea about what to expect after the test.  How can you care for yourself at home? Activity   · Rest as much as you need to after you go home. · You should be able to go back to your usual activities the day after the test.  Diet   · Follow your doctor's directions for eating after the test.  · Drink plenty of fluids (unless your doctor has told you not to). Medications   · If you have a sore throat the day after the test, use an over-the-counter spray to numb your throat. Follow-up care is a key part of your treatment and safety. Be sure to make and go to all appointments, and call your doctor if you are having problems. It's also a good idea to know your test results and keep a list of the medicines you take. When should you call for help? Call 911 anytime you think you may need emergency care. For example, call if:    · You passed out (lost consciousness).     · You have trouble breathing.     · You pass maroon or bloody stools.    Call your doctor now or seek immediate medical care if:    · You have pain that does not get better after your take pain medicine.     · You have new or worse belly pain.     · You have blood in your stools.     · You are sick to your stomach and cannot keep fluids down.     · You have a fever.     · You cannot pass stools or gas. Watch closely for changes in your health, and be sure to contact your doctor if:    · Your throat still hurts after a day or two.     · You do not get better as expected. Where can you learn more? Go to http://www.simmons.com/  Enter J454 in the search box to learn more about \"Upper GI Endoscopy: What to Expect at Home. \"  Current as of: April 15, 2020               Content Version: 12.8  © 2006-2021 [employer]. Care instructions adapted under license by [employer] (which disclaims liability or warranty for this information). If you have questions about a medical condition or this instruction, always ask your healthcare professional. [address] any warranty or liability for your use of this information.        .Patient armband removed and shredded

## 2021-05-17 NOTE — ANESTHESIA POSTPROCEDURE EVALUATION
Procedure(s):  UPPER ENDOSCOPY / Hedda Font.     MAC    Anesthesia Post Evaluation      Multimodal analgesia: multimodal analgesia used between 6 hours prior to anesthesia start to PACU discharge  Patient location during evaluation: bedside  Patient participation: complete - patient participated  Level of consciousness: awake  Pain score: 0  Airway patency: patent  Anesthetic complications: no  Cardiovascular status: acceptable  Respiratory status: acceptable  Hydration status: acceptable  Post anesthesia nausea and vomiting:  none      INITIAL Post-op Vital signs:   Vitals Value Taken Time   /66 05/17/21 1130   Temp 36.6 °C (97.8 °F) 05/17/21 1130   Pulse 79 05/17/21 1130   Resp 20 05/17/21 1130   SpO2 99 % 05/17/21 1130

## 2021-05-17 NOTE — ANESTHESIA PREPROCEDURE EVALUATION
Relevant Problems   No relevant active problems       Anesthetic History   No history of anesthetic complications            Review of Systems / Medical History  Patient summary reviewed, nursing notes reviewed and pertinent labs reviewed    Pulmonary  Within defined limits                 Neuro/Psych   Within defined limits           Cardiovascular  Within defined limits                Exercise tolerance: >4 METS     GI/Hepatic/Renal     GERD: well controlled           Endo/Other        Arthritis     Other Findings              Physical Exam    Airway  Mallampati: II  TM Distance: 4 - 6 cm  Neck ROM: normal range of motion   Mouth opening: Normal     Cardiovascular  Regular rate and rhythm,  S1 and S2 normal,  no murmur, click, rub, or gallop  Rhythm: regular  Rate: normal         Dental  No notable dental hx       Pulmonary  Breath sounds clear to auscultation               Abdominal         Other Findings            Anesthetic Plan    ASA: 1  Anesthesia type: MAC          Induction: Intravenous  Anesthetic plan and risks discussed with: Patient

## 2021-05-18 VITALS
TEMPERATURE: 97.8 F | DIASTOLIC BLOOD PRESSURE: 74 MMHG | RESPIRATION RATE: 19 BRPM | HEIGHT: 69 IN | SYSTOLIC BLOOD PRESSURE: 118 MMHG | WEIGHT: 219 LBS | BODY MASS INDEX: 32.44 KG/M2 | HEART RATE: 56 BPM | OXYGEN SATURATION: 100 %

## 2022-03-19 PROBLEM — M48.061 LUMBAR SPINAL STENOSIS: Status: ACTIVE | Noted: 2017-08-01

## 2022-03-19 PROBLEM — M54.40 ACUTE LEFT-SIDED LOW BACK PAIN WITH SCIATICA: Status: ACTIVE | Noted: 2017-07-26

## 2022-03-20 PROBLEM — M51.26 LUMBAR HERNIATED DISC: Status: ACTIVE | Noted: 2017-08-01

## 2022-03-20 PROBLEM — R29.898 LEFT LEG WEAKNESS: Status: ACTIVE | Noted: 2017-07-26

## 2022-07-27 ENCOUNTER — OFFICE VISIT (OUTPATIENT)
Dept: ORTHOPEDIC SURGERY | Age: 52
End: 2022-07-27
Payer: COMMERCIAL

## 2022-07-27 ENCOUNTER — TELEPHONE (OUTPATIENT)
Dept: ORTHOPEDIC SURGERY | Age: 52
End: 2022-07-27

## 2022-07-27 VITALS
OXYGEN SATURATION: 95 % | HEIGHT: 69 IN | TEMPERATURE: 98.2 F | HEART RATE: 73 BPM | WEIGHT: 237.8 LBS | BODY MASS INDEX: 35.22 KG/M2

## 2022-07-27 DIAGNOSIS — M51.36 DDD (DEGENERATIVE DISC DISEASE), LUMBAR: ICD-10-CM

## 2022-07-27 DIAGNOSIS — M54.50 LUMBAR PAIN: Primary | ICD-10-CM

## 2022-07-27 DIAGNOSIS — M54.16 LUMBAR NEURITIS: ICD-10-CM

## 2022-07-27 DIAGNOSIS — M51.26 HNP (HERNIATED NUCLEUS PULPOSUS), LUMBAR: ICD-10-CM

## 2022-07-27 PROCEDURE — 99204 OFFICE O/P NEW MOD 45 MIN: CPT | Performed by: PHYSICAL MEDICINE & REHABILITATION

## 2022-07-27 PROCEDURE — 72100 X-RAY EXAM L-S SPINE 2/3 VWS: CPT | Performed by: PHYSICAL MEDICINE & REHABILITATION

## 2022-07-27 RX ORDER — AMITRIPTYLINE HYDROCHLORIDE 25 MG/1
25 TABLET, FILM COATED ORAL
COMMUNITY
Start: 2022-05-09

## 2022-07-27 RX ORDER — TOPIRAMATE 25 MG/1
25 TABLET ORAL
Qty: 30 TABLET | Refills: 1 | Status: SHIPPED | OUTPATIENT
Start: 2022-07-27 | End: 2022-08-30 | Stop reason: SDUPTHER

## 2022-07-27 RX ORDER — CYCLOBENZAPRINE HCL 10 MG
10 TABLET ORAL
Qty: 60 TABLET | Refills: 0 | Status: SHIPPED | OUTPATIENT
Start: 2022-07-27

## 2022-07-27 RX ORDER — ACETAMINOPHEN 500 MG
500 TABLET ORAL
COMMUNITY

## 2022-07-27 RX ORDER — FEXOFENADINE HCL AND PSEUDOEPHEDRINE HCI 180; 240 MG/1; MG/1
1 TABLET, EXTENDED RELEASE ORAL DAILY
COMMUNITY

## 2022-07-27 RX ORDER — METHYLPREDNISOLONE 4 MG/1
TABLET ORAL
Qty: 1 DOSE PACK | Refills: 0 | Status: SHIPPED | OUTPATIENT
Start: 2022-07-27 | End: 2022-08-30 | Stop reason: ALTCHOICE

## 2022-07-27 RX ORDER — HYDROCODONE BITARTRATE AND ACETAMINOPHEN 5; 325 MG/1; MG/1
1 TABLET ORAL
Qty: 60 TABLET | Refills: 0 | Status: SHIPPED | OUTPATIENT
Start: 2022-07-27 | End: 2022-08-26

## 2022-07-27 NOTE — TELEPHONE ENCOUNTER
Received a fax that patients hydrocodone required a prior auth. This was done through covermymeds and am waiting on a response from the insurance company.

## 2022-07-27 NOTE — PROGRESS NOTES
[facility]  [address], [address]  Phone: [phone]  Fax: [phone]        INITIAL CONSULTATION      HISTORY OF PRESENT ILLNESS:  Colton Castleman is a 47 y.o. male whom is self referred back to me for left sided low back pain radiating into the LLE in a S1 distribution to the knee x 2 weeks after getting out of bed. He rates his pain 10/10. Pt reports his pain has been doing well since his last office visit with Myrtle Crawford NP on 2/13/2022. Pt reports his pain is progressive. His pain is aggravated with standing, alleviated with sitting. Patient denies previous spinal surgery, injections, or physical therapy/chiropractic care. Pt has been treating with OTC Tylenol w/o relief. He was most recently seen by Myrtle Crawford NP on 2/13/2019 for chronic low back pain radiating into the LLE to the ankle. He had been taking Topamax 75 mg BID with benefit. He had bewn c/o neck pain x 2 weeks after he fell asleep in a chair. Neck pain had been radiating into the left shoulder. Had been provided with Naprosyn and had his Topamax refilled. Scheduled to follow up in 6 months' time but failed to do so. The pt was previously seen by me on 3/12/2018 for low back pain into the left hip with LLE tingling in a L5 distribution to the calf. (LLE>low back pain) He had been tolerating and doing well with Topamax 75 mg qhs. L spine MRI noted moderate left paracentral extruded L5-S1 disc herniation. Pain had been a 0/10 and was scheduled to follow up in 3 month's time but failed to do so. Treated with Elavil. Patient denies previous spinal surgery, injections, or physical therapy/chiropractic care. Pt denies fever, weight loss, or skin changes. Pt denies change in bowel or bladder habits. Pt denies h/o stomach ulcers or bleeding disorders. Pt is a former smoker. PmHx of noncontributory. Pt reports intolerance to Naprosyn. The patient is RHD.  reviewed.  Body mass index is 35.12 kg/m².    PCP: None    Past Medical History:   Diagnosis Date    GERD (gastroesophageal reflux disease)           Past Surgical History:   Procedure Laterality Date    NE EGD DELIVER THERMAL ENERGY SPHNCTR/CARDIA GERD           Social History     Tobacco Use    Smoking status: Former     Packs/day: 0.25     Types: Cigarettes    Smokeless tobacco: Never    Tobacco comments:     quit 2020   Substance Use Topics    Alcohol use: Yes     Alcohol/week: 12.0 standard drinks     Types: 12 Cans of beer per week       Work status: N/a  Marital status: N/A      Current Outpatient Medications   Medication Sig Dispense Refill    acetaminophen (Tylenol Extra Strength) 500 mg tablet Take 500 mg by mouth every six (6) hours as needed for Pain. fexofenadine-pseudoephedrine (Allegra-D 24 Hour) 180-240 mg per tablet Take 1 Tablet by mouth in the morning. pantoprazole (PROTONIX) 40 mg tablet Take 40 mg by mouth two (2) times a day. folic acid/multivit-min/lutein (CENTRUM SILVER PO) Take  by mouth. amitriptyline (ELAVIL) 25 mg tablet Take 25 mg by mouth nightly. (Patient not taking: Reported on 7/27/2022)      cetirizine (ZYRTEC) 10 mg tablet Take  by mouth. (Patient not taking: Reported on 7/27/2022)         No Known Allergies         Family History   Problem Relation Age of Onset    No Known Problems Mother     No Known Problems Father          REVIEW OF SYSTEMS  Constitutional symptoms: Negative  Eyes: Negative  Ears, Nose, Throat, and Mouth: Negative  Cardiovascular: Negative  Respiratory: Negative  Genitourinary: Negative  Integumentary (Skin and/or breast): Negative  Musculoskeletal: Positive for left sided low back pain radiating into the LLE in a S1 distribution to the knee  Extremities: Negative for edema.   Endocrine/Rheumatologic: Negative  Hematologic/Lymphatic: Negative  Allergic/Immunologic: Negative  Psychiatric: Negative       PHYSICAL EXAMINATION  Visit Vitals  Pulse 73   Temp 98.2 °F (36.8 °C) (Temporal)   Ht 5' 9\" (1.753 m)   Wt 237 lb 12.8 oz (107.9 kg)   SpO2 95%   BMI 35.12 kg/m²       CONSTITUTIONAL: NAD, A&O x 3  HEART: Regular rate and rhythm  GASTROINTESTINAL: Positive bowel sounds, soft, nontender, and nondistended  LUNGS: Clear to auscultation bilaterally. SKIN: Negative for rash. RANGE OF MOTION: The patient has full passive range of motion in all four extremities. SENSATION: sensation is intact to light touch throughout. MOTOR:   Straight Leg Raise: Positive, left  Toro: Negative, bilateral  Tandem Gait: Neg. Deep tendon reflexes are 0 at the biceps, 0 at the brachioradialis and 0 at the triceps,  bilaterally. Deep tendon reflexes are trace on the right and 0 on the left at the knees and 1 on the right and 0 on the left at the ankles. Shoulder AB/Flex Elbow Flex Wrist Ext Elbow Ext Wrist Flex Hand Intrin Tone   Right +4/5 +4/5 +4/5 +4/5 +4/5 +4/5 +4/5   Left +4/5 +4/5 +4/5 +4/5 +4/5 +4/5 +4/5              Hip Flex Knee Ext Knee Flex Ankle DF GTE Ankle PF Tone   Right +4/5 +4/5 +4/5 +4/5 +4/5 +4/5 +4/5   Left +4/5 +4/5 +4/5 +4/5 +4/5 +4/5 +4/5     RADIOGRAPHS  Lumbar spine plain films dated 7/27/2022. 2 views: AP and lateral. Revealed:  Severe disc space narrowing at L5- S1. No malalignment. No acute pathology identified. ASSESSMENT   Diagnoses and all orders for this visit:    1. Lumbar pain  -     AMB POC XRAY, SPINE, LUMBOSACRAL; 2 O    2. DDD (degenerative disc disease), lumbar    3. Lumbar neuritis       IMPRESSIONS/RECOMMENDATIONS:  Patient presents today with c/o left sided low back pain radiating into the LLE in a S1 distribution to the knee. Multiple treatment options were discussed. Pt shows recurrent sxs which I suspect to be related to previous known disc herniation at L5-S1. I will start him on Medrol Dosepak followed by Flexeril 10 mg TID prn. I will restart him on Topamax 75 mg qhs.  The risks, benefits, and potential side effects of this medication were discussed. Patient understands and wishes to proceed. Patient advised to call the office if intolerant to new medication. I will also provide with a prescription of [place] and a OOW note until 8/2/2022. Declined PT. Patient is neurologically intact. I will see the patient back in 1 month's time or earlier if needed. Written by Max Jones, as dictated by Jamal Pickens MD  I examined the patient, reviewed and agree with the note.

## 2022-07-27 NOTE — LETTER
NOTIFICATION RETURN TO WORK / SCHOOL    7/27/2022 3:10 PM    Mr. Hernan Shah  [address] [place] [address]      To Whom It May Concern:    Hernan Shah is currently under the care of [address] He is on a no duty status until 8-2-22. If there are questions or concerns please have the patient contact our office.         Sincerely,      Gomez Salmon MD

## 2022-08-02 NOTE — TELEPHONE ENCOUNTER
Tried calling [employer] and was transferred 4 times. No one could seem to help me and couldn't find a PA. Patient has already paid out of pocket for the medication.

## 2022-08-26 DIAGNOSIS — M54.16 LUMBAR NEURITIS: ICD-10-CM

## 2022-08-26 DIAGNOSIS — M54.50 LUMBAR PAIN: ICD-10-CM

## 2022-08-26 DIAGNOSIS — M51.36 DDD (DEGENERATIVE DISC DISEASE), LUMBAR: ICD-10-CM

## 2022-08-26 RX ORDER — TOPIRAMATE 25 MG/1
TABLET ORAL
Qty: 90 TABLET | OUTPATIENT
Start: 2022-08-26

## 2022-08-30 ENCOUNTER — OFFICE VISIT (OUTPATIENT)
Dept: ORTHOPEDIC SURGERY | Age: 52
End: 2022-08-30
Payer: COMMERCIAL

## 2022-08-30 VITALS
BODY MASS INDEX: 34.85 KG/M2 | TEMPERATURE: 97.8 F | WEIGHT: 236 LBS | OXYGEN SATURATION: 99 % | RESPIRATION RATE: 18 BRPM | HEART RATE: 69 BPM

## 2022-08-30 DIAGNOSIS — M51.36 DDD (DEGENERATIVE DISC DISEASE), LUMBAR: ICD-10-CM

## 2022-08-30 DIAGNOSIS — M54.50 LUMBAR PAIN: Primary | ICD-10-CM

## 2022-08-30 DIAGNOSIS — M54.16 LUMBAR NEURITIS: ICD-10-CM

## 2022-08-30 DIAGNOSIS — M51.26 HNP (HERNIATED NUCLEUS PULPOSUS), LUMBAR: ICD-10-CM

## 2022-08-30 PROCEDURE — 99214 OFFICE O/P EST MOD 30 MIN: CPT | Performed by: PHYSICAL MEDICINE & REHABILITATION

## 2022-08-30 RX ORDER — TOPIRAMATE 25 MG/1
TABLET ORAL
Qty: 90 TABLET | Refills: 1 | Status: SHIPPED | OUTPATIENT
Start: 2022-08-30 | End: 2022-10-17 | Stop reason: SDUPTHER

## 2022-08-30 RX ORDER — HYDROCODONE BITARTRATE AND ACETAMINOPHEN 5; 325 MG/1; MG/1
1 TABLET ORAL
Qty: 60 TABLET | Refills: 0 | Status: SHIPPED | OUTPATIENT
Start: 2022-08-30 | End: 2022-09-29

## 2022-08-30 NOTE — LETTER
NOTIFICATION RETURN TO WORK / SCHOOL    8/30/2022 10:31 AM    Mr. Elsa Hernandez  [address] [address]      To Whom It May Concern:    Elsa Hernandez is currently under the care of [address] He was seen in office 08/30/2022    If there are questions or concerns please have the patient contact our office.         Sincerely,      Yessica Borja MD

## 2022-08-30 NOTE — PROGRESS NOTES
[facility]  [address], [address]  Phone: [phone]  Fax: [phone]        PROGRESS NOTE      HISTORY OF PRESENT ILLNESS:  The patient is a 47 y.o. male and was seen today for follow up of left sided low back pain radiating into the LLE in a S1 distribution to the knee x 2 weeks after getting out of bed. He rates his pain 10/10. Pt reports his pain has been doing well since his last office visit with Cruzito Poe NP on 2/13/2022. Pt reports his pain is progressive. His pain is aggravated with standing, alleviated with sitting. Patient denies previous spinal surgery, injections, or physical therapy/chiropractic care. Pt has been treating with OTC Tylenol w/o relief. He was most recently seen by Cruzito Poe NP on 2/13/2019 for chronic low back pain radiating into the LLE to the ankle. He had been taking Topamax 75 mg BID with benefit. He had bewn c/o neck pain x 2 weeks after he fell asleep in a chair. Neck pain had been radiating into the left shoulder. Had been provided with Naprosyn and had his Topamax refilled. Scheduled to follow up in 6 months' time but failed to do so. The pt was previously seen by me on 3/12/2018 for low back pain into the left hip with LLE tingling in a L5 distribution to the calf. (LLE>low back pain) He had been tolerating and doing well with Topamax 75 mg qhs. L spine MRI noted moderate left paracentral extruded L5-S1 disc herniation. Pain had been a 0/10 and was scheduled to follow up in 3 month's time but failed to do so. Treated with Elavil. Patient denies previous spinal surgery, injections, or physical therapy/chiropractic care. Pt denies fever, weight loss, or skin changes. Pt denies change in bowel or bladder habits. Pt denies h/o stomach ulcers or bleeding disorders. Pt is a former smoker. PmHx of noncontributory. Pt reports intolerance to Naprosyn.   Pt showed recurrent sxs which I suspect to be related to previous known disc herniation at L5-S1. I started him on Medrol Dosepak followed by Flexeril 10 mg TID prn. I restarted him on Topamax 75 mg qhs. The risks, benefits, and potential side effects of this medication were discussed. Patient understood and wished to proceed. Patient advised to call the office if intolerant to new medication. I provided with a prescription of [place] and a [facility]OW note until 8/2/2022. Declined PT. The patient returns today with LLE pain in a S1 distribution to the knee. He rates his pain 9/10, previously 10/10. He does not have back pain. His symptoms are exacerbated with sitting. He has been taking Topamax 25 mg QHS due to an incorrectly written prescription. He reports benefit with MDP and Norco. Pt denies change in bowel or bladder habits.  reviewed, rx of hydrocodone filled by me 7/27/2022         reviewed. Body mass index is 34.85 kg/m². PCP: None      Past Medical History:   Diagnosis Date    GERD (gastroesophageal reflux disease)         Social History     Socioeconomic History    Marital status:      Spouse name: Not on file    Number of children: Not on file    Years of education: Not on file    Highest education level: Not on file   Occupational History    Not on file   Tobacco Use    Smoking status: Former     Packs/day: 0.25     Types: Cigarettes    Smokeless tobacco: Never    Tobacco comments:     quit 2020   Vaping Use    Vaping Use: Never used   Substance and Sexual Activity    Alcohol use:  Yes     Alcohol/week: 12.0 standard drinks     Types: 12 Cans of beer per week    Drug use: No    Sexual activity: Not on file   Other Topics Concern    Not on file   Social History Narrative    Not on file     Social Determinants of Health     Financial Resource Strain: Not on file   Food Insecurity: Not on file   Transportation Needs: Not on file   Physical Activity: Not on file   Stress: Not on file   Social Connections: Not on file   Intimate Partner Violence: Not on file Housing Stability: Not on file       Current Outpatient Medications   Medication Sig Dispense Refill    acetaminophen (TYLENOL) 500 mg tablet Take 500 mg by mouth every six (6) hours as needed for Pain. fexofenadine-pseudoephedrine (ALLEGRA-D 24) 180-240 mg per tablet Take 1 Tablet by mouth in the morning. cyclobenzaprine (FLEXERIL) 10 mg tablet Take 1 Tablet by mouth three (3) times daily as needed for Muscle Spasm(s). 60 Tablet 0    topiramate (TOPAMAX) 25 mg tablet Take 1 Tablet by mouth nightly. 30 Tablet 1    pantoprazole (PROTONIX) 40 mg tablet Take 40 mg by mouth two (2) times a day. folic acid/multivit-min/lutein (CENTRUM SILVER PO) Take  by mouth. amitriptyline (ELAVIL) 25 mg tablet Take 25 mg by mouth nightly. (Patient not taking: Reported on 7/27/2022)      cetirizine (ZYRTEC) 10 mg tablet Take  by mouth. (Patient not taking: Reported on 7/27/2022)         Allergies   Allergen Reactions    Ibuprofen Other (comments)     Stomach upset      Naproxen Other (comments)     Stomach upset          REVIEW OF SYSTEMS  Constitutional symptoms: Negative  Eyes: Negative  Ears, Nose, Throat, and Mouth: Negative  Cardiovascular: Negative  Respiratory: Negative  Genitourinary: Negative  Integumentary (Skin and/or breast): Negative  Musculoskeletal: negative  Extremities: Negative for edema. Endocrine/Rheumatologic: Negative  Hematologic/Lymphatic: Negative  Allergic/Immunologic: Negative  Psychiatric: Negative     PHYSICAL EXAMINATION    Visit Vitals  Pulse 69   Temp 97.8 °F (36.6 °C)   Resp 18   Wt 236 lb (107 kg)   SpO2 99%   BMI 34.85 kg/m²       CONSTITUTIONAL: NAD, A&O x 3  SENSATION: Intact to light touch throughout  NEURO: Patrick's is negative bilaterally. RANGE OF MOTION: The patient has full passive range of motion in all four extremities.   MOTOR:  Straight Leg Raise: Negative, bilateral     Shoulder AB/Flex Elbow Flex Wrist Ext Elbow Ext Wrist Flex Hand Intrin Tone   Right +4/5 +4/5 +4/5 +4/5 +4/5 +4/5 +4/5   Left +4/5 +4/5 +4/5 +4/5 +4/5 +4/5 +4/5              Hip Flex Knee Ext Knee Flex Ankle DF GTE Ankle PF Tone   Right +4/5 +4/5 +4/5 +4/5 +4/5 +4/5 +4/5   Left +4/5 +4/5 +4/5 +4/5 +4/5 +4/5 +4/5       ASSESSMENT   Diagnoses and all orders for this visit:    1. Lumbar pain    2. DDD (degenerative disc disease), lumbar    3. Lumbar neuritis    4. HNP (herniated nucleus pulposus), lumbar        IMPRESSION AND PLAN:  Patient returns to the office today with c/o LLE pain in a S1 distribution to the knee. Patient does not have back pain. Multiple treatment options were discussed. Patient will ramp Topamax to 75 mg QHS, previous prescription incorrectly written. I will refill his Hydrocodone. I informed the patient that he would have to continue future refills through a pain management program. Patient declined physical therapy. Patient is neurologically intact. I will see the patient back in 1 month's time or earlier if needed. Written by Tete Mejia, as dictated by Ce Quick MD  I examined the patient, reviewed and agree with the note.

## 2022-10-17 ENCOUNTER — OFFICE VISIT (OUTPATIENT)
Dept: ORTHOPEDIC SURGERY | Age: 52
End: 2022-10-17
Payer: COMMERCIAL

## 2022-10-17 VITALS
HEART RATE: 65 BPM | OXYGEN SATURATION: 99 % | BODY MASS INDEX: 34.6 KG/M2 | HEIGHT: 69 IN | WEIGHT: 233.6 LBS | TEMPERATURE: 97.8 F

## 2022-10-17 DIAGNOSIS — M51.36 DDD (DEGENERATIVE DISC DISEASE), LUMBAR: ICD-10-CM

## 2022-10-17 DIAGNOSIS — M54.50 LUMBAR PAIN: ICD-10-CM

## 2022-10-17 DIAGNOSIS — M54.16 LUMBAR NEURITIS: ICD-10-CM

## 2022-10-17 PROCEDURE — 99213 OFFICE O/P EST LOW 20 MIN: CPT | Performed by: PHYSICAL MEDICINE & REHABILITATION

## 2022-10-17 RX ORDER — TOPIRAMATE 25 MG/1
TABLET ORAL
Qty: 270 TABLET | Refills: 0 | Status: SHIPPED | OUTPATIENT
Start: 2022-10-17

## 2022-10-17 NOTE — LETTER
NOTIFICATION RETURN TO WORK / SCHOOL    10/17/2022 9:59 AM    Mr. David Wells  [address] [address]      To Whom It May Concern:    David Wells is currently under the care of [address] He is not to lift anything greater than 25lbs until his follow up appointment in 3 months. If there are questions or concerns please have the patient contact our office.         Sincerely,      Raffy Gilbert MD

## 2022-10-17 NOTE — PROGRESS NOTES
[facility]  [address], [address]  Phone: [phone]  Fax: [phone]        PROGRESS NOTE      HISTORY OF PRESENT ILLNESS:  The patient is a 47 y.o. male and was seen today for follow up of The patient is a 47 y.o. male and was seen today for follow up of left sided low back pain radiating into the LLE in a S1 distribution to the knee x 2 months after getting out of bed. He rates his pain 10/10. Pt reports his pain has been doing well since his last office visit with Hank Zavaleta NP on 2/13/2022. Pt reports his pain is progressive. His pain is aggravated with standing, alleviated with sitting. Patient denies previous spinal surgery, injections, or physical therapy/chiropractic care. Pt has been treating with OTC Tylenol w/o relief. He was most recently seen by Hank Zavaleta NP on 2/13/2019 for chronic low back pain radiating into the LLE to the ankle. He had been taking Topamax 75 mg BID with benefit. He had bewn c/o neck pain x 2 weeks after he fell asleep in a chair. Neck pain had been radiating into the left shoulder. Had been provided with Naprosyn and had his Topamax refilled. Scheduled to follow up in 6 months' time but failed to do so. The pt was previously seen by me on 3/12/2018 for low back pain into the left hip with LLE tingling in a L5 distribution to the calf. (LLE>low back pain) He had been tolerating and doing well with Topamax 75 mg qhs. L spine MRI noted moderate left paracentral extruded L5-S1 disc herniation. Pain had been a 0/10 and was scheduled to follow up in 3 month's time but failed to do so. Treated with Elavil. Patient denies previous spinal surgery, injections, or physical therapy/chiropractic care. Pt denies fever, weight loss, or skin changes. Pt denies change in bowel or bladder habits. Pt denies h/o stomach ulcers or bleeding disorders. Pt is a former smoker. PmHx of noncontributory. Pt reports intolerance to Naprosyn.   Pt showed recurrent sxs which I suspect to be related to previous known disc herniation at L5-S1. At last clinic visit, patient was instructed to ramp up Topamax to 75 MG QHS at nighttime. The patient returns today with left sided low back pain radiating into the LLE in a S1 distribution to the knee x 2 months after getting out of bed. He rates his pain 1--5/10, previously 9/10. He is currently taking Topamax 75 QHS with relief and is tolerating the medication. He notes his pain is not as intense with taking the Topamax but does report tingling in the bilateral feet. He denies any bowel or bladder changes.  reviewed. Body mass index is 34.5 kg/m². PCP: None      Past Medical History:   Diagnosis Date    GERD (gastroesophageal reflux disease)         Social History     Socioeconomic History    Marital status:      Spouse name: Not on file    Number of children: Not on file    Years of education: Not on file    Highest education level: Not on file   Occupational History    Not on file   Tobacco Use    Smoking status: Former     Packs/day: 0.25     Years: 5.00     Pack years: 1.25     Types: Cigarettes     Quit date:      Years since quittin.7    Smokeless tobacco: Never    Tobacco comments:     quit    Vaping Use    Vaping Use: Never used   Substance and Sexual Activity    Alcohol use:  Yes     Alcohol/week: 12.0 standard drinks     Types: 12 Cans of beer per week    Drug use: No    Sexual activity: Not on file   Other Topics Concern    Not on file   Social History Narrative    Not on file     Social Determinants of Health     Financial Resource Strain: Not on file   Food Insecurity: Not on file   Transportation Needs: Not on file   Physical Activity: Not on file   Stress: Not on file   Social Connections: Not on file   Intimate Partner Violence: Not on file   Housing Stability: Not on file       Current Outpatient Medications   Medication Sig Dispense Refill    topiramate (TOPAMAX) 25 mg tablet Take 3 po qhs 90 Tablet 1    acetaminophen (TYLENOL) 500 mg tablet Take 500 mg by mouth every six (6) hours as needed for Pain. fexofenadine-pseudoephedrine (ALLEGRA-D 24) 180-240 mg per tablet Take 1 Tablet by mouth in the morning. cyclobenzaprine (FLEXERIL) 10 mg tablet Take 1 Tablet by mouth three (3) times daily as needed for Muscle Spasm(s). 60 Tablet 0    pantoprazole (PROTONIX) 40 mg tablet Take 40 mg by mouth two (2) times a day. folic acid/multivit-min/lutein (CENTRUM SILVER PO) Take  by mouth. amitriptyline (ELAVIL) 25 mg tablet Take 25 mg by mouth nightly. (Patient not taking: No sig reported)      cetirizine (ZYRTEC) 10 mg tablet Take  by mouth. (Patient not taking: No sig reported)         Allergies   Allergen Reactions    Ibuprofen Other (comments)     Stomach upset      Naproxen Other (comments)     Stomach upset          PHYSICAL EXAMINATION    Visit Vitals  Pulse 65   Temp 97.8 °F (36.6 °C) (Temporal)   Ht 5' 9\" (1.753 m)   Wt 233 lb 9.6 oz (106 kg)   SpO2 99%   BMI 34.50 kg/m²       CONSTITUTIONAL: NAD, A&O x 3  SENSATION: Intact to light touch throughout  NEURO: Patrick's is negative bilaterally. RANGE OF MOTION: The patient has full passive range of motion in all four extremities. MOTOR:  Straight Leg Raise: Negative, bilateral    Ambulates without assist of a device     Shoulder AB/Flex Elbow Flex Wrist Ext Elbow Ext Wrist Flex Hand Intrin Tone   Right +4/5 +4/5 +4/5 +4/5 +4/5 +4/5 +4/5   Left +4/5 +4/5 +4/5 +4/5 +4/5 +4/5 +4/5              Hip Flex Knee Ext Knee Flex Ankle DF GTE Ankle PF Tone   Right +4/5 +4/5 +4/5 +4/5 +4/5 +4/5 +4/5   Left +4/5 +4/5 +4/5 +4/5 +4/5 +4/5 +4/5       ASSESSMENT   Diagnoses and all orders for this visit:    1. Lumbar pain    2. DDD (degenerative disc disease), lumbar    3.  Lumbar neuritis        IMPRESSION AND PLAN:  Patient returns to the office today with c/o left sided low back pain radiating into the LLE in a S1 distribution to the knee and bilateral lower extremity paraesthesias. He rates his pain 1-5/10. Multiple treatment options were discussed. Patient is neurologically intact. He is experiencing neuropathic symptoms with taking Topamax 75 MG QHS including bilateral lower extremity paresthesias but would like to continue with taking as he is benefiting and he was provided with refill in the office. I will see the patient back in 3 month's time or earlier if needed. He was provided with work note stating he cannot lift anything above 25 pounds. Written by Santa Andujar, as dictated by Lourdes Lubin MD  I examined the patient, reviewed and agree with the note.

## 2023-03-24 ENCOUNTER — OFFICE VISIT (OUTPATIENT)
Age: 53
End: 2023-03-24
Payer: COMMERCIAL

## 2023-03-24 VITALS
DIASTOLIC BLOOD PRESSURE: 71 MMHG | HEIGHT: 69 IN | RESPIRATION RATE: 18 BRPM | SYSTOLIC BLOOD PRESSURE: 106 MMHG | HEART RATE: 67 BPM | OXYGEN SATURATION: 98 % | BODY MASS INDEX: 34.5 KG/M2 | TEMPERATURE: 97.6 F

## 2023-03-24 DIAGNOSIS — M54.16 LUMBAR NEURITIS: Primary | ICD-10-CM

## 2023-03-24 DIAGNOSIS — M51.26 HNP (HERNIATED NUCLEUS PULPOSUS), LUMBAR: ICD-10-CM

## 2023-03-24 DIAGNOSIS — M51.36 DDD (DEGENERATIVE DISC DISEASE), LUMBAR: ICD-10-CM

## 2023-03-24 PROCEDURE — 99214 OFFICE O/P EST MOD 30 MIN: CPT | Performed by: PHYSICAL MEDICINE & REHABILITATION

## 2023-03-24 RX ORDER — METHYLPREDNISOLONE 4 MG/1
TABLET ORAL
Qty: 1 KIT | Refills: 0 | Status: SHIPPED | OUTPATIENT
Start: 2023-03-24

## 2023-03-24 RX ORDER — CYCLOBENZAPRINE HCL 10 MG
10 TABLET ORAL 2 TIMES DAILY PRN
Qty: 40 TABLET | Refills: 0 | Status: SHIPPED | OUTPATIENT
Start: 2023-03-24 | End: 2023-04-13

## 2023-03-24 ASSESSMENT — PATIENT HEALTH QUESTIONNAIRE - PHQ9
1. LITTLE INTEREST OR PLEASURE IN DOING THINGS: 0
SUM OF ALL RESPONSES TO PHQ QUESTIONS 1-9: 0
SUM OF ALL RESPONSES TO PHQ9 QUESTIONS 1 & 2: 0
2. FEELING DOWN, DEPRESSED OR HOPELESS: 0
SUM OF ALL RESPONSES TO PHQ QUESTIONS 1-9: 0

## 2023-03-24 NOTE — PROGRESS NOTES
time or earlier if needed. Written by Shravan Sorto, as dictated by Rin Orozco MD  I examined the patient, reviewed and agree with the note.

## 2023-03-24 NOTE — Clinical Note
March 24, 2023       Priscila Sloan YOB: 1970   [address] [place] [address] Date of Visit:  3/24/2023       To Whom It May Concern: It is my medical opinion that Jarad Pearl {Work release (duty restriction):55880}. If you have any questions or concerns, please don't hesitate to call.     Sincerely,        Keith Dee MD

## 2023-03-24 NOTE — LETTER
March 24, 2023       Thanh Hung YOB: 1970   [address] [place] [address] Date of Visit:  3/24/2023       To Whom It May Concern: It is my medical opinion that Vinnie Miller should not participate in lifting anything greater than 25 lbs. If you have any questions or concerns, please don't hesitate to call.     Sincerely,        Marcell Heredia MD

## 2023-03-24 NOTE — LETTER
3/24/2023        [address] [place] [address]      RETURN TO WORK OR SCHOOL    To Whom It May Concern:    Zonia Ar,  is under the care of [address]. He is released to return to work or school on 03/24/23 with the restrictions of not lifting more than 25 lbs. Please have the patient contact our office if there are questions or concerns.       Sincerely,      Cristofer Ribeiro MD

## 2023-04-21 ENCOUNTER — OFFICE VISIT (OUTPATIENT)
Age: 53
End: 2023-04-21
Payer: COMMERCIAL

## 2023-04-21 VITALS
OXYGEN SATURATION: 98 % | DIASTOLIC BLOOD PRESSURE: 77 MMHG | HEIGHT: 69 IN | TEMPERATURE: 98.1 F | HEART RATE: 57 BPM | WEIGHT: 234 LBS | BODY MASS INDEX: 34.66 KG/M2 | SYSTOLIC BLOOD PRESSURE: 117 MMHG

## 2023-04-21 DIAGNOSIS — M51.36 DDD (DEGENERATIVE DISC DISEASE), LUMBAR: ICD-10-CM

## 2023-04-21 DIAGNOSIS — M54.16 LUMBAR NEURITIS: Primary | ICD-10-CM

## 2023-04-21 DIAGNOSIS — M51.26 HNP (HERNIATED NUCLEUS PULPOSUS), LUMBAR: ICD-10-CM

## 2023-04-21 PROCEDURE — 99214 OFFICE O/P EST MOD 30 MIN: CPT | Performed by: PHYSICAL MEDICINE & REHABILITATION

## 2023-04-21 RX ORDER — DULOXETIN HYDROCHLORIDE 30 MG/1
30 CAPSULE, DELAYED RELEASE ORAL
Qty: 30 CAPSULE | Refills: 1 | Status: SHIPPED | OUTPATIENT
Start: 2023-04-21

## 2023-04-21 NOTE — PROGRESS NOTES
[facility]  [address], [place]  Phone: [phone]  Fax: [phone]        PROGRESS NOTE      HISTORY OF PRESENT ILLNESS:  The patient is a 47 y.o. male and was seen today for follow up of low back pain radiating to the LLE in a S1 to the heel. Previously seen for left sided low back pain radiating into the LLE in a S1 distribution to the knee x 2 months after getting out of bed. Pt reports his pain has been doing well since his last office  visit with Lulu Motley NP on 2/13/2022. Pt reports his pain is progressive. His pain is aggravated with standing, alleviated with sitting. Patient denies previous spinal surgery, injections,  or physical therapy/chiropractic care. Pt has been treating with OTC Tylenol w/o relief. He was most recently seen by Lulu Motley NP on 2/13/2019 for chronic low back pain radiating into the LLE  to the ankle. He had been taking Topamax 75 mg BID with benefit. He had bewn c/o neck pain x 2 weeks after he fell asleep in a chair. Neck  pain had been radiating into the left shoulder. Had been provided with Naprosyn and had his Topamax refilled. Scheduled to follow up in 6 months' time but failed to do so. The pt was previously seen by me on 3/12/2018 for low back pain into the left hip  with LLE tingling in a L5 distribution to the calf. (LLE>low back pain) He had been tolerating and doing well with Topamax 75 mg qhs. L spine MRI noted moderate left paracentral  extruded L5-S1 disc herniation. Pain had been a 0/10 and was scheduled to follow up in 3 month's time but failed to do so. Treated with Elavil. Patient  denies previous spinal surgery, injections, or physical therapy/chiropractic care. Pt denies fever, weight loss, or skin changes. Pt denies change in bowel or bladder habits. Pt denies h/o stomach  ulcers or bleeding disorders. Pt is a former smoker. PmHx of  noncontributory. Pt reports intolerance to Naprosyn.   Pt showed

## 2023-06-09 ENCOUNTER — OFFICE VISIT (OUTPATIENT)
Age: 53
End: 2023-06-09
Payer: COMMERCIAL

## 2023-06-09 VITALS
TEMPERATURE: 98 F | HEART RATE: 60 BPM | OXYGEN SATURATION: 97 % | WEIGHT: 239 LBS | DIASTOLIC BLOOD PRESSURE: 71 MMHG | SYSTOLIC BLOOD PRESSURE: 118 MMHG | BODY MASS INDEX: 35.4 KG/M2 | HEIGHT: 69 IN

## 2023-06-09 DIAGNOSIS — M54.50 LUMBAR PAIN: ICD-10-CM

## 2023-06-09 DIAGNOSIS — M51.36 DDD (DEGENERATIVE DISC DISEASE), LUMBAR: ICD-10-CM

## 2023-06-09 DIAGNOSIS — M54.16 LUMBAR NEURITIS: Primary | ICD-10-CM

## 2023-06-09 DIAGNOSIS — M51.26 HNP (HERNIATED NUCLEUS PULPOSUS), LUMBAR: ICD-10-CM

## 2023-06-09 PROCEDURE — 99214 OFFICE O/P EST MOD 30 MIN: CPT | Performed by: PHYSICAL MEDICINE & REHABILITATION

## 2023-06-09 RX ORDER — DULOXETIN HYDROCHLORIDE 30 MG/1
30 CAPSULE, DELAYED RELEASE ORAL
Qty: 90 CAPSULE | Refills: 0 | Status: SHIPPED | OUTPATIENT
Start: 2023-06-09

## 2023-06-09 NOTE — PROGRESS NOTES
radiating to the LLE in a S1 to the heel. Multiple treatment options were discussed. I discussed increasing his Cymbalta 30 mg QHS to 60 mg QHS, pt declined. Patient wished to continue with the current treatment plan. I refilled his Cymbalta 30 mg QHS. Patient advised to call the office if intolerant to higher dose. Patient is not interested in surgery or injection at this time. I offered referring him to PT, pt declined. I provided him a new note for work saying he could not lift greater than 25 pounds. Patient is neurologically intact. I will see the patient back in 3 month's time or earlier if needed. Written by Collette Shire, as dictated by Nathaniel Ramos MD  I examined the patient, reviewed and agree with the note.

## 2023-09-08 ENCOUNTER — OFFICE VISIT (OUTPATIENT)
Age: 53
End: 2023-09-08
Payer: COMMERCIAL

## 2023-09-08 VITALS
DIASTOLIC BLOOD PRESSURE: 75 MMHG | OXYGEN SATURATION: 97 % | SYSTOLIC BLOOD PRESSURE: 110 MMHG | TEMPERATURE: 97.4 F | HEART RATE: 67 BPM | WEIGHT: 245 LBS | BODY MASS INDEX: 36.29 KG/M2 | HEIGHT: 69 IN

## 2023-09-08 DIAGNOSIS — M54.16 LUMBAR NEURITIS: Primary | ICD-10-CM

## 2023-09-08 DIAGNOSIS — M51.36 DDD (DEGENERATIVE DISC DISEASE), LUMBAR: ICD-10-CM

## 2023-09-08 DIAGNOSIS — M54.50 LUMBAR PAIN: ICD-10-CM

## 2023-09-08 DIAGNOSIS — M51.26 HNP (HERNIATED NUCLEUS PULPOSUS), LUMBAR: ICD-10-CM

## 2023-09-08 PROCEDURE — 99214 OFFICE O/P EST MOD 30 MIN: CPT | Performed by: PHYSICAL MEDICINE & REHABILITATION

## 2023-09-08 RX ORDER — DULOXETIN HYDROCHLORIDE 30 MG/1
30 CAPSULE, DELAYED RELEASE ORAL
Qty: 90 CAPSULE | Refills: 1 | Status: SHIPPED | OUTPATIENT
Start: 2023-09-08

## 2023-09-08 NOTE — PROGRESS NOTES
[facility]  [id number]  [address]  [place], [place]  Tel: [phone]  Fax: [phone]          PROGRESS NOTE      HISTORY OF PRESENT ILLNESS:  The patient is a 47 y.o. male and was seen today for follow up of low back pain radiating to the LLE in a S1 to the heel. Previously seen for left sided low back pain radiating into the LLE in a S1 distribution to the knee x 2 months after getting out of bed. Pt reports his pain has been doing well since his last office  visit with Natali Burgess NP on 2/13/2022. Pt reports his pain is progressive. His pain is aggravated with standing, alleviated with sitting. Patient denies previous spinal surgery, injections,  or physical therapy/chiropractic care. Pt has been treating with OTC Tylenol w/o relief. He was most recently seen by Natali Burgess NP on 2/13/2019 for chronic low back pain radiating into the LLE  to the ankle. He had been taking Topamax 75 mg BID with benefit. He had bewn c/o neck pain x 2 weeks after he fell asleep in a chair. Neck  pain had been radiating into the left shoulder. Had been provided with Naprosyn and had his Topamax refilled. Scheduled to follow up in 6 months' time but failed to do so. The pt was previously seen by me on 3/12/2018 for low back pain into the left hip  with LLE tingling in a L5 distribution to the calf. (LLE>low back pain) He had been tolerating and doing well with Topamax 75 mg qhs. L spine MRI noted moderate left paracentral  extruded L5-S1 disc herniation. Pain had been a 0/10 and was scheduled to follow up in 3 month's time but failed to do so. Treated with Elavil. Patient  denies previous spinal surgery, injections, or physical therapy/chiropractic care. Pt denies fever, weight loss, or skin changes. Pt denies change in bowel or bladder habits. Pt denies h/o stomach  ulcers or bleeding disorders. Pt is a former smoker. PmHx of  noncontributory.  Pt reports intolerance to

## (undated) DEVICE — FLUFF AND POLYMER UNDERPAD,EXTRA HEAVY: Brand: WINGS

## (undated) DEVICE — CATHETER SUCT TR FL TIP 14FR W/ O CTRL

## (undated) DEVICE — BASIN EMESIS 500CC ROSE 250/CS 60/PLT: Brand: MEDEGEN MEDICAL PRODUCTS, LLC

## (undated) DEVICE — GAUZE,SPONGE,4"X4",16PLY,STRL,LF,10/TRAY: Brand: MEDLINE

## (undated) DEVICE — SYR 50ML SLIP TIP NSAF LF STRL --

## (undated) DEVICE — FLEX ADVANTAGE 3000CC: Brand: FLEX ADVANTAGE

## (undated) DEVICE — BITE BLOCK ENDOSCP UNIV AD 6 TO 9.4 MM

## (undated) DEVICE — AIRLIFE™ NASAL OXYGEN CANNULA CURVED, FLARED TIP WITH 14 FOOT (4.3 M) CRUSH-RESISTANT TUBING, OVER-THE-EAR STYLE: Brand: AIRLIFE™

## (undated) DEVICE — STERILE POLYISOPRENE POWDER-FREE SURGICAL GLOVES: Brand: PROTEXIS

## (undated) DEVICE — Device

## (undated) DEVICE — MEDI-VAC NON-CONDUCTIVE SUCTION TUBING: Brand: CARDINAL HEALTH

## (undated) DEVICE — FCPS RAD JAW 4LC 240CM W/NDL -- BX/20 RADIAL JAW 4

## (undated) DEVICE — MEDI-VAC SUCTION HIGH CAPACITY: Brand: CARDINAL HEALTH

## (undated) DEVICE — SOLUTION IRRIG 1000ML H2O STRL BLT

## (undated) DEVICE — ENDOSCOPY PUMP TUBING/ CAP SET: Brand: ERBE

## (undated) DEVICE — SYRINGE MED 25GA 3ML L5/8IN SUBQ PLAS W/ DETACH NDL SFTY